# Patient Record
Sex: FEMALE | Race: WHITE | NOT HISPANIC OR LATINO | Employment: FULL TIME | URBAN - METROPOLITAN AREA
[De-identification: names, ages, dates, MRNs, and addresses within clinical notes are randomized per-mention and may not be internally consistent; named-entity substitution may affect disease eponyms.]

---
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
## 2018-01-02 DIAGNOSIS — F41.9 ANXIETY: ICD-10-CM

## 2018-01-02 RX ORDER — ALPRAZOLAM 0.25 MG/1
0.25 TABLET ORAL 2 TIMES DAILY PRN
Qty: 10 TAB | Refills: 0 | Status: SHIPPED
Start: 2018-01-02 | End: 2018-01-15 | Stop reason: SDUPTHER

## 2018-01-02 NOTE — TELEPHONE ENCOUNTER
Was the patient seen in the last year in this department? Yes  refill 11/22. Can you check the amount and sig    Does patient have an active prescription for medications requested? No     Received Request Via: Patient

## 2018-01-11 DIAGNOSIS — F41.9 ANXIETY: ICD-10-CM

## 2018-01-15 ENCOUNTER — OFFICE VISIT (OUTPATIENT)
Dept: MEDICAL GROUP | Facility: LAB | Age: 18
End: 2018-01-15
Payer: OTHER GOVERNMENT

## 2018-01-15 VITALS
BODY MASS INDEX: 19.33 KG/M2 | TEMPERATURE: 98 F | OXYGEN SATURATION: 100 % | HEART RATE: 98 BPM | WEIGHT: 116 LBS | HEIGHT: 65 IN | RESPIRATION RATE: 12 BRPM

## 2018-01-15 DIAGNOSIS — G47.9 SLEEP DISTURBANCE: ICD-10-CM

## 2018-01-15 DIAGNOSIS — F41.1 GAD (GENERALIZED ANXIETY DISORDER): ICD-10-CM

## 2018-01-15 DIAGNOSIS — F41.0 ANXIETY ATTACK: ICD-10-CM

## 2018-01-15 PROCEDURE — 99213 OFFICE O/P EST LOW 20 MIN: CPT | Performed by: NURSE PRACTITIONER

## 2018-01-15 RX ORDER — TRAZODONE HYDROCHLORIDE 50 MG/1
50 TABLET ORAL EVERY EVENING
Qty: 30 TAB | Refills: 3 | Status: SHIPPED | OUTPATIENT
Start: 2018-01-15 | End: 2018-02-09

## 2018-01-15 RX ORDER — ALPRAZOLAM 0.25 MG/1
0.25 TABLET ORAL 2 TIMES DAILY PRN
Qty: 20 TAB | Refills: 0 | Status: SHIPPED
Start: 2018-01-15 | End: 2018-02-14

## 2018-01-15 ASSESSMENT — PATIENT HEALTH QUESTIONNAIRE - PHQ9: CLINICAL INTERPRETATION OF PHQ2 SCORE: 0

## 2018-01-15 NOTE — PROGRESS NOTES
"CC  Follow up on anxiety    HPI  17-year-old established female here to follow-up on anxiety. Anxiety and depression are chronic issues for her. Feels that her depression is improved. Anxiety is calming down. At our last visit we changed to lorazepam for panic attacks but she is no undertaking this, she states that Lorazepam worsened her anxiety.  Xanax does work well for her - helps relieve her anxiety attacks during the day and fall asleep at night.  Likes wellbutrin - less random crying and no depressive symptoms.   Motivation is good as well and energy is increased.  She does have an appointment with psychiatry at the beginning of February.  In terms of her sleep, she has trouble falling and staying asleep.  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   Nonsmoker.    2/9/2018.      Past medical, surgical, family, and social history is reviewed and updated in Epic chart by me today.   Medications and allergies reviewed and updated in Epic chart by me today.     ROS:   As documented in history of present illness above    Exam:  Pulse 98, temperature 36.7 °C (98 °F), resp. rate 12, height 1.651 m (5' 5\"), weight 52.6 kg (116 lb), SpO2 100 %.  Gen. appears healthy in no distress   Skin appropriate for sex and age   HEENT unremarkable   Lungs clear   Heart regular   Neuro gait and station normal   Psych appropriate, calm, interactive, well groomed, makes good eye contact      A/P:  1. Sleep disturbance  -Suspect that Wellbutrin may be contributing to her sleep disturbance and will do a trial of trazodone 50 mg prior to bedtime for the next 1-2 months, then potentially trying to stop this medication and she becomes more accustomed to taking Wellbutrin. Discussed the importance of refraining from caffeine after morning hours and encouraged her to exercise every day. Discussed potential side effects of trazodone and encouraged her to allow at least 8 hours for sleep when taking trazodone.  - trazodone " (DESYREL) 50 MG Tab; Take 1 Tab by mouth every evening.  Dispense: 30 Tab; Refill: 3    2. ASHLEY (generalized anxiety disorder)  -Continue Wellbutrin. Continue alprazolam only sparingly and as needed for acute or severe panic attacks.  - trazodone (DESYREL) 50 MG Tab; Take 1 Tab by mouth every evening.  Dispense: 30 Tab; Refill: 3    3. Anxiety attack  -Encouraged her to keep her appointment with psychiatry in early February as I am concerned that Wellbutrin may not be the perfect drug for this patient as it does seem to interrupt her sleep area she will follow-up with me one week after meeting with psychiatry. We discussed dependent qualities of alprazolam and she understands to take it sparingly, not on a day-to-day basis. Discussed the potential for chemical dependency and overdose of alprazolam. She understands to avoid alcohol when taking alprazolam.  - alprazolam (XANAX) 0.25 MG Tab; Take 1 Tab by mouth 2 times a day as needed for Anxiety (or panic attack) for up to 30 days.  Dispense: 20 Tab; Refill: 0

## 2018-02-09 ENCOUNTER — OFFICE VISIT (OUTPATIENT)
Dept: BEHAVIORAL HEALTH | Facility: PHYSICIAN GROUP | Age: 18
End: 2018-02-09
Payer: OTHER GOVERNMENT

## 2018-02-09 VITALS
SYSTOLIC BLOOD PRESSURE: 134 MMHG | HEART RATE: 94 BPM | WEIGHT: 111 LBS | DIASTOLIC BLOOD PRESSURE: 87 MMHG | HEIGHT: 64 IN | BODY MASS INDEX: 18.95 KG/M2

## 2018-02-09 DIAGNOSIS — F33.42 MDD (MAJOR DEPRESSIVE DISORDER), RECURRENT, IN FULL REMISSION (HCC): ICD-10-CM

## 2018-02-09 PROCEDURE — 99204 OFFICE O/P NEW MOD 45 MIN: CPT | Performed by: PSYCHIATRY & NEUROLOGY

## 2018-02-09 ASSESSMENT — PATIENT HEALTH QUESTIONNAIRE - PHQ9: CLINICAL INTERPRETATION OF PHQ2 SCORE: 0

## 2018-02-09 NOTE — PROGRESS NOTES
"INITIAL PSYCHIATRY EVALUATION      Chief Complaint   Patient presents with   • Depression   • Anxiety         History Of Present Illness:  Tania Melo is a 17 y.o. old female with history of depressive disorder, anxiety disorder referred by MARQUISE Portillo for evaluation of anxiety and depression. She reports onset of this episode of depression about 2-3 months ago. She describes her depression as feeling sad, decreased motivation, impaired sleep, feeling isolated, agitation and irritability, anxiety lasting for weeks to months in a row. She states that this time it got to a point where she was unable to go to school because of her depression. She was started on Wellbutrin about 2 months ago and she is feeling \"200% better\". She has noted some apathy with Wellbutrin but denies any other side effects. She has had on and off symptoms of depression since she was about 6 years old. She states that her depression started when her dad was deployed overseas for about 2 years. She denies any precipitating factors for her depression at this time. She does have relationship stressors with her mother but she gets well along with her father and younger brother. She denies any current ups and downs in her appetite. She does struggle with sleep on and off. She has been sleeping a little bit better but does struggle with getting a good 6-8 hour sleep. She does not have a fixed sleeping schedule and sometimes sleeps poorly due to school are hanging out with friends. She is doing good in school and denies any school related stressors. She denies any current self-harm behaviors or reckless or impulsive actions. She does endorse anxiety which gets worse whenever she is depressed. She is unable to relax and has anxiety attacks. She does take Xanax a few times a week which helps her with anxiety. She was also taking trazodone for sleep which is not working and she is no longer taking it. She denies any current " symptoms of hypomania/kendrick or psychosis. Denies having active or passive thoughts, intent or plan of wanting to hurt herself or others.    Current psychiatric medications - Wellbutrin  mg daily, Xanax 0.25 mg daily as needed for anxiety    Past Psychiatric History:  She reports suffering from depressive and anxiety episodes since she was 6 years old and was in counseling at the age of 10 and 16. She was tried on Zoloft about 2 years ago which she was unable to continue due to side effects.  Prior psychiatric hospitalization - Denies   Self harm/suicide attempt - Reports cutting behaviors in seventh grade and denies any self-harm behavior since then. She reports having suicidal ideations in seventh grade for which she was taken to the ER at [place] but was not hospitalized and was sent home after a few hours.   Previous medication trials - Zoloft x 2 weeks     Past Medical/Surgical History:  History reviewed. No pertinent past medical history.  History reviewed. No pertinent surgical history.    Family Psychiatric History:  Mother - possible borderline personality disorder  Maternal side of her family - bipolar and narcissistic personality disorder    Substance Use/Addiction History:  Alcohol - Endorses drinking few sips infrequently, denies daily drinking or binge drinking or black outs.  Nicotine - Denies  Illicit drugs - Denies    Social History:    Allergies:  Egg yolk and Other misc    Medications:  Current Outpatient Prescriptions   Medication Sig Dispense Refill   • buPROPion (WELLBUTRIN XL) 150 MG XL tablet Take 1 Tab by mouth every morning. 30 Tab 5   • levonorgestrel-ethinyl estradiol (JOLESSA) 0.15-0.03 MG per tablet TAKE 1 TABLET DAILY 91 Tab 3   • alprazolam (XANAX) 0.25 MG Tab Take 1 Tab by mouth 2 times a day as needed for Anxiety (or panic attack) for up to 30 days. 20 Tab 0   • NON SPECIFIED Vaginal diazepam 5 mg   #10, zero refills  Insert vaginally 30 minutes prior to intercourse 10 Each  "0     No current facility-administered medications for this visit.        Review of Symptoms:  Constitutional - Negative for fatigue  Eyes - Negative for blurry vision  HEENT - Negative for sore throat  Respiratory - Negative for shortness of breath, cough  CVS - Negative for chest pain, palpitations  GI - Negative for nausea, vomiting, abdominal pain, diarrhea, constipation  Skin - Negative for rash  Musculoskeletal - Negative for back pain  Neurological - Positive for infrequent headaches  Psychiatric - Positive for occasional anxiety, poor sleep    Physical Examination:  Vital signs: /87   Pulse 94   Ht 1.626 m (5' 4\")   Wt 50.3 kg (111 lb)   LMP 02/05/2018   BMI 19.05 kg/m²     Musculoskeletal: Normal gait. No abnormal movements.     Mental Status Evaluation:   General: Young  female, dressed in casual attire, good grooming and hygiene, in no apparent distress, calm and cooperative, good eye contact, no psychomotor agitation or retardation  Orientation: Alert and oriented to person, place and time  Recent and remote memory: Intact  Attention span and concentration: Intact  Speech: Spontaneous, normal rate, rhythm and tone  Thought Process: Linear, logical and goal directed  Thought Content: Denies suicidal or homicidal ideations, intent or plan  Perception: Denies auditory or visual hallucinations. No delusions noted  Associations: Intact  Language: Appropriate  Fund of knowledge and vocabulary: Adequate  Mood: \"am okay\"  Affect: Euthymic, mood congruent  Insight: Good  Judgment: Good    Depression screening:  Depression Screen (PHQ-2/PHQ-9) 12/18/2017 1/15/2018 2/9/2018   PHQ-2 Total Score - - -   PHQ-2 Total Score 0 0 0   PHQ-9 Total Score - - -   PHQ-9 Total Score - - -       Interpretation of PHQ-9 Total Score   Score Severity   1-4 No Depression   5-9 Mild Depression   10-14 Moderate Depression   15-19 Moderately Severe Depression   20-27 Severe Depression    Medical " Records/Labs/Diagnostic Tests Reviewed:  [place]  records - appropriate refills, no abuse suspected    Impression:  Young female with recurrent symptoms of depression with anxiety is currently doing better with Wellbutrin.    1. Major depressive disorder, recurrent, in full remission with anxious distress    Plan:  1. Continue Wellbutrin  mg daily for anxiety and depression  2. Continue Xanax 0.25 mg daily as needed for anxiety. Not refilled today. Discussed long-term side effects including tolerance and withdrawal potential and encouraged her to minimize her Xanax use as much as she can. She is a young adult with minimal coping skills and I have encouraged her to work on her coping skills with a therapist and to use her skills rather than relying on Xanax whenever she is feeling anxious.  3. Referral to individual psychotherapy  4. Her sleep issues appear to be more related to impaired sleep hygiene rather than a side effect from Wellbutrin. She does not have a fixed sleeping schedule and consumes significant amounts of caffeine. I discussed sleep hygiene with her and encouraged her to have a fixed sleeping schedule, to minimize distractions before bedtime and to minimize stimulant intake throughout the day. I have discontinued her Trazodone she is not endorsing much benefit from it.  5. I have advised her to continue with her PCP for now and if she notices any decline in her mood or anxiety symptoms she can schedule a follow-up appointment with me.    Return to clinic on as needed basis if symptoms worsen    The proposed treatment plan was discussed with the patient who was provided the opportunity to ask questions and make suggestions regarding alternative treatment. Patient verbalized understanding and expressed agreement with the plan.     Thank you for allowing me to participate in the care of this patient.    Anna aMrin M.D.  02/09/18    CC:   MARQUISE Portillo    This note was created  using voice recognition software (Dragon). The accuracy of the dictation is limited by the abilities of the software. I have reviewed the note prior to signing, however some errors in grammar and context are still possible. If you have any questions related to this note please do not hesitate to contact our office.

## 2018-02-20 ENCOUNTER — OFFICE VISIT (OUTPATIENT)
Dept: MEDICAL GROUP | Facility: LAB | Age: 18
End: 2018-02-20
Payer: OTHER GOVERNMENT

## 2018-02-20 ENCOUNTER — HOSPITAL ENCOUNTER (OUTPATIENT)
Facility: MEDICAL CENTER | Age: 18
End: 2018-02-20
Attending: NURSE PRACTITIONER
Payer: OTHER GOVERNMENT

## 2018-02-20 VITALS
DIASTOLIC BLOOD PRESSURE: 78 MMHG | SYSTOLIC BLOOD PRESSURE: 128 MMHG | OXYGEN SATURATION: 99 % | HEIGHT: 64 IN | BODY MASS INDEX: 19.46 KG/M2 | RESPIRATION RATE: 12 BRPM | HEART RATE: 102 BPM | WEIGHT: 114 LBS | TEMPERATURE: 99.3 F

## 2018-02-20 DIAGNOSIS — Z11.3 SCREEN FOR STD (SEXUALLY TRANSMITTED DISEASE): ICD-10-CM

## 2018-02-20 DIAGNOSIS — F33.42 MDD (MAJOR DEPRESSIVE DISORDER), RECURRENT, IN FULL REMISSION (HCC): ICD-10-CM

## 2018-02-20 DIAGNOSIS — F41.1 GAD (GENERALIZED ANXIETY DISORDER): ICD-10-CM

## 2018-02-20 DIAGNOSIS — R41.840 ATTENTION DEFICIT: ICD-10-CM

## 2018-02-20 PROCEDURE — 87591 N.GONORRHOEAE DNA AMP PROB: CPT

## 2018-02-20 PROCEDURE — 87491 CHLMYD TRACH DNA AMP PROBE: CPT

## 2018-02-20 PROCEDURE — 99214 OFFICE O/P EST MOD 30 MIN: CPT | Performed by: NURSE PRACTITIONER

## 2018-02-20 RX ORDER — DEXTROAMPHETAMINE SACCHARATE, AMPHETAMINE ASPARTATE MONOHYDRATE, DEXTROAMPHETAMINE SULFATE AND AMPHETAMINE SULFATE 1.25; 1.25; 1.25; 1.25 MG/1; MG/1; MG/1; MG/1
5 CAPSULE, EXTENDED RELEASE ORAL EVERY MORNING
Qty: 30 CAP | Refills: 0 | Status: SHIPPED | OUTPATIENT
Start: 2018-02-20 | End: 2018-03-22 | Stop reason: SDUPTHER

## 2018-02-20 ASSESSMENT — PATIENT HEALTH QUESTIONNAIRE - PHQ9: CLINICAL INTERPRETATION OF PHQ2 SCORE: 0

## 2018-02-21 DIAGNOSIS — Z11.3 SCREEN FOR STD (SEXUALLY TRANSMITTED DISEASE): ICD-10-CM

## 2018-02-21 NOTE — PROGRESS NOTES
"Chief Complaint   Patient presents with   • Depression     F/V on depression    • Orders Needed     pt would orders for STD testing after having a high risk partner, no current symptoms        HPI   17-year-old established female here to follow-up on a couple of things:  #1- anxiety and depression: Improved. Doing well on Wellbutrin 150 mg XL daily. She did seek psychiatry and was told that her medication was appropriate and to continue on the same thing. Denies any suicidal or homicidal ideations. Has a bit of apathy. Appetite is good. Sleeping decently at night, occasional trouble falling asleep and waking up periodically.     #2-she is concerned about having brain fog and difficulty concentrating. Describes feeling bimal in class, \"mash potato\" brain, having to re-read material 4-5 x, loosing items easily such as phone (a few x per day), loosing papers in class.   Grades doing well in a few classes. C&D in others.  Wants to interrupt people frequently.  Frequently leaving projects incomplete.    Has experienced these symptoms since about 8th grade but never treated.     #3-she is requesting STD screening. She denies any current symptoms such as abnormal vaginal discharge, pelvic pain, vaginal itching, night sweats, fevers, chills, unintentional weight loss. She was sexually active with a high risk partner for 2 weeks. Her high risk partner notified her that she had been sexually active with over 100 people in the past year. Her high risk partner was transgender as a male, had a vagina. There was a lot of fluid exchange per patient but no penetration.       Past medical, surgical, family, and social history is reviewed and updated in Epic chart by me today.   Medications and allergies reviewed and updated in Epic chart by me today.     ROS:   As documented in history of present illness above    Exam:  Blood pressure 128/78, pulse (!) 102, temperature 37.4 °C (99.3 °F), resp. rate 12, height 1.626 m (5' 4\"), weight " 51.7 kg (114 lb), last menstrual period 02/05/2018, SpO2 99 %.  Gen. appears healthy in no distress   Skin appropriate for sex and age   HEENT unremarkable   Neck no adenopathy, no nodules or masses palpable   Lungs clear   Heart regular   Extremities no edema   Neuro gait and station normal   Psych appropriate, calm, interactive.      A/P:  1. Screen for STD (sexually transmitted disease)  -Recommend below STD screening. Encouraged her to repeat an HIV screening every 3 months for the next 6 months.  - HIV AG/AB COMBO ASSAY SCREENING; Future  - HEP C VIRUS ANTIBODY  - T.PALLIDUM AB EIA; Future  - CHLAMYDIA/GC PCR URINE OR SWAB; Future    2. MDD (major depressive disorder), recurrent, in full remission (CMS-HCC)  -Stable with Wellbutrin.    3. ASHLEY (generalized anxiety disorder)  -Stable with Wellbutrin.    4. Attention deficit  -Suspect that she may have attention deficit. We'll do a trial of Adderall XR 5 mg every morning. Started extremely low due to her history of anxiety. Discussed that if this medication makes her more anxious, she should discontinue it immediately and follow up with psychiatry. Discussed that Adderall is a controlled substance and there will be no early refills. She will need to be seen here in one month.  - amphetamine-dextroamphetamine (ADDERALL XR) 5 MG XR capsule; Take 1 Cap by mouth every morning for 30 days.  Dispense: 30 Cap; Refill: 0

## 2018-03-22 ENCOUNTER — OFFICE VISIT (OUTPATIENT)
Dept: MEDICAL GROUP | Facility: LAB | Age: 18
End: 2018-03-22
Payer: OTHER GOVERNMENT

## 2018-03-22 ENCOUNTER — HOSPITAL ENCOUNTER (OUTPATIENT)
Dept: LAB | Facility: MEDICAL CENTER | Age: 18
End: 2018-03-22
Attending: NURSE PRACTITIONER
Payer: OTHER GOVERNMENT

## 2018-03-22 VITALS
BODY MASS INDEX: 19.63 KG/M2 | HEIGHT: 64 IN | SYSTOLIC BLOOD PRESSURE: 90 MMHG | WEIGHT: 115 LBS | RESPIRATION RATE: 12 BRPM | OXYGEN SATURATION: 92 % | TEMPERATURE: 99.3 F | DIASTOLIC BLOOD PRESSURE: 62 MMHG | HEART RATE: 87 BPM

## 2018-03-22 DIAGNOSIS — R41.840 ATTENTION DEFICIT: ICD-10-CM

## 2018-03-22 DIAGNOSIS — F33.42 MDD (MAJOR DEPRESSIVE DISORDER), RECURRENT, IN FULL REMISSION (HCC): ICD-10-CM

## 2018-03-22 DIAGNOSIS — F41.9 ANXIETY: ICD-10-CM

## 2018-03-22 DIAGNOSIS — Z11.3 SCREEN FOR STD (SEXUALLY TRANSMITTED DISEASE): ICD-10-CM

## 2018-03-22 DIAGNOSIS — Z79.899 CONTROLLED SUBSTANCE AGREEMENT SIGNED: ICD-10-CM

## 2018-03-22 LAB
HCV AB SER QL: NEGATIVE
HIV 1+2 AB+HIV1 P24 AG SERPL QL IA: NON REACTIVE
TREPONEMA PALLIDUM IGG+IGM AB [PRESENCE] IN SERUM OR PLASMA BY IMMUNOASSAY: NON REACTIVE

## 2018-03-22 PROCEDURE — 86803 HEPATITIS C AB TEST: CPT

## 2018-03-22 PROCEDURE — 87389 HIV-1 AG W/HIV-1&-2 AB AG IA: CPT

## 2018-03-22 PROCEDURE — 36415 COLL VENOUS BLD VENIPUNCTURE: CPT

## 2018-03-22 PROCEDURE — 99214 OFFICE O/P EST MOD 30 MIN: CPT | Performed by: NURSE PRACTITIONER

## 2018-03-22 PROCEDURE — 86780 TREPONEMA PALLIDUM: CPT

## 2018-03-22 RX ORDER — DEXTROAMPHETAMINE SACCHARATE, AMPHETAMINE ASPARTATE MONOHYDRATE, DEXTROAMPHETAMINE SULFATE AND AMPHETAMINE SULFATE 1.25; 1.25; 1.25; 1.25 MG/1; MG/1; MG/1; MG/1
5 CAPSULE, EXTENDED RELEASE ORAL EVERY MORNING
Qty: 28 CAP | Refills: 0 | Status: SHIPPED | OUTPATIENT
Start: 2018-05-18 | End: 2018-06-07

## 2018-03-22 RX ORDER — PROPRANOLOL HYDROCHLORIDE 20 MG/1
10-20 TABLET ORAL DAILY
Qty: 45 TAB | Refills: 5 | Status: SHIPPED | OUTPATIENT
Start: 2018-03-22 | End: 2018-06-07

## 2018-03-22 RX ORDER — DEXTROAMPHETAMINE SACCHARATE, AMPHETAMINE ASPARTATE MONOHYDRATE, DEXTROAMPHETAMINE SULFATE AND AMPHETAMINE SULFATE 1.25; 1.25; 1.25; 1.25 MG/1; MG/1; MG/1; MG/1
5 CAPSULE, EXTENDED RELEASE ORAL EVERY MORNING
Qty: 28 CAP | Refills: 0 | Status: SHIPPED | OUTPATIENT
Start: 2018-04-20 | End: 2018-05-18

## 2018-03-22 RX ORDER — DEXTROAMPHETAMINE SACCHARATE, AMPHETAMINE ASPARTATE MONOHYDRATE, DEXTROAMPHETAMINE SULFATE AND AMPHETAMINE SULFATE 1.25; 1.25; 1.25; 1.25 MG/1; MG/1; MG/1; MG/1
5 CAPSULE, EXTENDED RELEASE ORAL EVERY MORNING
Qty: 28 CAP | Refills: 0 | Status: SHIPPED | OUTPATIENT
Start: 2018-03-22 | End: 2018-04-19

## 2018-03-22 ASSESSMENT — PATIENT HEALTH QUESTIONNAIRE - PHQ9: CLINICAL INTERPRETATION OF PHQ2 SCORE: 0

## 2018-03-22 NOTE — PROGRESS NOTES
"Chief Complaint   Patient presents with   • ADHD     F/V on meds,        HPI  17-year-old established female here to follow up on initiation of low-dose Adderall for what she calls brain fog. She has found that she is doing really well with Adderall and denies any negative side effects. Her concentration is much improved with reading and productivity at work. She finds herself interrupting others much less often. She feels very calm. She is sleeping well. Her mood 7 proved even more. Her appetite is fine. She denies any problems with Adderall such as increased anxiety, insomnia or low appetite.    She continues on Wellbutrin for anxiety and depression and feels this works very well for her without any side effects. She did have a psychiatry consult a few months ago and the psychiatrist felt positive about what the patient was prescribed.    Often she does complain that she has very sweaty hands and her heart rate is high prior to class. She feels persistently very anxious about school. She would like to know if there is a medicine she can take only on school days that could help lower her heart rate and help her relax.      Past medical, surgical, family, and social history is reviewed and updated in Epic chart by me today.   Medications and allergies reviewed and updated in Epic chart by me today.     ROS:   No SI or HI    Exam:  Blood pressure (!) 90/62, pulse 87, temperature 37.4 °C (99.3 °F), resp. rate 12, height 1.626 m (5' 4\"), weight 52.2 kg (115 lb), SpO2 92 %.  Constitutional: Alert, no distress, plus 3 vital signs  Skin:  Warm, dry, no rashes invisible areas  Eye: Equal, round and reactive, conjunctiva clear  ENMT: Lips without lesions, good dentition, oropharynx clear    Respiratory: Unlabored respiration, lungs clear to auscultation, no wheezes, no rhonchi  Cardiovascular: Normal rate and rhythm  Psych: Alert, pleasant, well-groomed, normal affect    A/P:  1. Anxiety  -Persistent with schooling. Trial " of propanolol tended 20 mg prior to school days. Discussed side effects of propanolol such as bradycardia, hypotension and fatigue.    2. MDD (major depressive disorder), recurrent, in full remission (CMS-[facility])  -Doing well with Wellbutrin. Continue same. Follow up 3 months.    3. Attention deficit  -Reviewed her  profile, last refill was February 22 by myself. Doing well with Adderall. Reviewed side effects. Controlled substance contract signed. [facility] collected. Follow up 3 months.  - amphetamine-dextroamphetamine (ADDERALL XR) 5 MG XR capsule; Take 1 Cap by mouth every morning for 28 days.  Dispense: 28 Cap; Refill: 0  - amphetamine-dextroamphetamine (ADDERALL XR) 5 MG XR capsule; Take 1 Cap by mouth every morning for 28 days.  Dispense: 28 Cap; Refill: 0  - amphetamine-dextroamphetamine (ADDERALL XR) 5 MG XR capsule; Take 1 Cap by mouth every morning for 28 days.  Dispense: 28 Cap; Refill: 0    4. Controlled substance agreement signed  - Controlled Substance Treatment Agreement  - [facility] PAIN MANAGEMENT SCREEN; Future

## 2018-03-28 ENCOUNTER — OFFICE VISIT (OUTPATIENT)
Dept: BEHAVIORAL HEALTH | Facility: PHYSICIAN GROUP | Age: 18
End: 2018-03-28
Payer: OTHER GOVERNMENT

## 2018-03-28 DIAGNOSIS — F41.9 ANXIETY: ICD-10-CM

## 2018-03-28 DIAGNOSIS — F32.A DEPRESSION, UNSPECIFIED DEPRESSION TYPE: ICD-10-CM

## 2018-03-28 PROCEDURE — 90791 PSYCH DIAGNOSTIC EVALUATION: CPT | Performed by: SOCIAL WORKER
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
## 2018-06-07 ENCOUNTER — HOSPITAL ENCOUNTER (EMERGENCY)
Facility: MEDICAL CENTER | Age: 18
End: 2018-06-08
Attending: EMERGENCY MEDICINE
Payer: OTHER GOVERNMENT

## 2018-06-07 DIAGNOSIS — F32.2 SEVERE DEPRESSION (HCC): ICD-10-CM

## 2018-06-07 DIAGNOSIS — R45.851 SUICIDAL IDEATION: ICD-10-CM

## 2018-06-07 PROCEDURE — 99285 EMERGENCY DEPT VISIT HI MDM: CPT

## 2018-06-07 PROCEDURE — 302970 POC BREATHALIZER: Performed by: EMERGENCY MEDICINE

## 2018-06-07 RX ORDER — ALPRAZOLAM 0.25 MG/1
0.25 TABLET ORAL NIGHTLY PRN
Status: SHIPPED | COMMUNITY
End: 2018-06-08

## 2018-06-07 ASSESSMENT — PAIN SCALES - GENERAL
PAINLEVEL_OUTOF10: 0
PAINLEVEL_OUTOF10: 0

## 2018-06-08 ENCOUNTER — APPOINTMENT (OUTPATIENT)
Dept: SCHEDULING | Facility: IMAGING CENTER | Age: 18
End: 2018-06-08

## 2018-06-08 VITALS
RESPIRATION RATE: 16 BRPM | BODY MASS INDEX: 19.83 KG/M2 | HEART RATE: 98 BPM | TEMPERATURE: 97.6 F | HEIGHT: 65 IN | OXYGEN SATURATION: 99 % | WEIGHT: 119.05 LBS | SYSTOLIC BLOOD PRESSURE: 134 MMHG | DIASTOLIC BLOOD PRESSURE: 81 MMHG

## 2018-06-08 LAB
AMPHETAMINES UR QL: NEGATIVE
BARBITURATES UR QL SCN: NEGATIVE
BENZODIAZ UR QL SCN: POSITIVE
BZE UR QL SCN: NEGATIVE
PCP UR QL SCN: NEGATIVE
POC BREATHALIZER: 0 PERCENT (ref 0–0.01)
UR OPIATES 2659: NEGATIVE
UR THC 2511T: NEGATIVE
UR TRICYCLIC 2660: NEGATIVE

## 2018-06-08 PROCEDURE — A9270 NON-COVERED ITEM OR SERVICE: HCPCS | Performed by: EMERGENCY MEDICINE

## 2018-06-08 PROCEDURE — 700102 HCHG RX REV CODE 250 W/ 637 OVERRIDE(OP): Performed by: EMERGENCY MEDICINE

## 2018-06-08 PROCEDURE — 80305 DRUG TEST PRSMV DIR OPT OBS: CPT

## 2018-06-08 RX ORDER — LEVONORGESTREL AND ETHINYL ESTRADIOL 0.15-0.03
1 KIT ORAL DAILY
Status: DISCONTINUED | OUTPATIENT
Start: 2018-06-09 | End: 2018-06-08 | Stop reason: HOSPADM

## 2018-06-08 RX ORDER — BUPROPION HYDROCHLORIDE 150 MG/1
150 TABLET, EXTENDED RELEASE ORAL DAILY
Status: DISCONTINUED | OUTPATIENT
Start: 2018-06-08 | End: 2018-06-08 | Stop reason: HOSPADM

## 2018-06-08 RX ORDER — CLONAZEPAM 0.5 MG/1
0.5 TABLET ORAL NIGHTLY PRN
Qty: 7 TAB | Refills: 0 | Status: SHIPPED | OUTPATIENT
Start: 2018-06-08 | End: 2018-06-27 | Stop reason: SDUPTHER

## 2018-06-08 RX ADMIN — BUPROPION HYDROCHLORIDE 150 MG: 150 TABLET, FILM COATED, EXTENDED RELEASE ORAL at 11:17

## 2018-06-08 ASSESSMENT — ENCOUNTER SYMPTOMS
DEPRESSION: 1
SHORTNESS OF BREATH: 0
FEVER: 0
CHILLS: 0

## 2018-06-08 NOTE — ED PROVIDER NOTES
"ED Provider Note    CHIEF COMPLAINT  Chief Complaint   Patient presents with   • Suicidal Ideation     Pt reports that tonight she feels like if she stayed home she would kill herself, BIB parents. Pt has a plan to drive out into the middle of nowhere and either take pills or cut her wrists. Pt did not attempt tonight. Pt has previous attempts and hospitalizations, dx with depression and anxiety but states \"I think I have borderline personality disorder.\" Denies HI. Denies drug or alcohol use tonight.        HPI  Tania Melo is a 17 y.o. female who presents With suicidal ideation. Patient with history of severe depression and history of suicidal attempts and suicidal ideation in the past requiring admission. Patient reports she has a plan, she would either take pills or cutting her wrist. She denies any ingestions today or attempts today or recently. She denies any nausea vomiting. She reports she is not pregnant. She denies any auditory or visual hallucinations. She denies any homicidal ideation.    REVIEW OF SYSTEMS  Review of Systems   Constitutional: Negative for chills and fever.   Respiratory: Negative for shortness of breath.    Cardiovascular: Negative for chest pain.   Psychiatric/Behavioral: Positive for depression and suicidal ideas.   All other systems reviewed and are negative.      See HPI for further details. All other systems are negative.     PAST MEDICAL HISTORY   has a past medical history of Psychiatric disorder.    SOCIAL HISTORY  Social History     Social History Main Topics   • Smoking status: Never Smoker   • Smokeless tobacco: Never Used   • Alcohol use Yes      Comment: occasional    • Drug use: Yes     Types: Inhaled      Comment: twice a year marijuana    • Sexual activity: No      Comment: menses regular - started 6th grade       SURGICAL HISTORY  patient denies any surgical history    CURRENT MEDICATIONS  Home Medications     Reviewed by Beverley Ding R.N. (Registered " Nurse) on 06/07/18 at 2152  Med List Status: Complete   Medication Last Dose Status   ALPRAZolam (XANAX) 0.25 MG Tab  Active   buPROPion (WELLBUTRIN XL) 150 MG XL tablet 6/7/2018 Active   levonorgestrel-ethinyl estradiol (JOLESSA) 0.15-0.03 MG per tablet 6/7/2018 Active                ALLERGIES  Allergies   Allergen Reactions   • Egg Yolk    • Other Misc      cats       PHYSICAL EXAM  Physical Exam   Constitutional: She is oriented to person, place, and time. She appears well-developed and well-nourished.   HENT:   Head: Normocephalic and atraumatic.   Eyes: Conjunctivae are normal. Pupils are equal, round, and reactive to light.   Neck: Normal range of motion. Neck supple.   Cardiovascular: Normal rate and regular rhythm.    Pulmonary/Chest: Effort normal and breath sounds normal.   Abdominal: Soft. Bowel sounds are normal. She exhibits no distension. There is no tenderness. There is no rebound.   Musculoskeletal: Normal range of motion.   Neurological: She is alert and oriented to person, place, and time.   Skin: Skin is warm and dry.   Psychiatric:   Flat affect, SI, no homicidal ideation, no auditory or visual hallucinations.         DIAGNOSTIC STUDIES / PROCEDURES    Results for orders placed or performed during the hospital encounter of 06/07/18   UR DRUG SCREEN([place] ONLY)   Result Value Ref Range    Phencyclidine -Pcp Negative Negative    Benzodiazepines Positive (A) Negative    Cocaine Metabolite Negative Negative    Amphetamines By Triage Negative Negative    Urine THC Negative Negative    Codeine-Morphine Negative Negative    Barbiturates Negative Negative    Tricyclic Antidepressants Negative Negative   POC BREATHALIZER   Result Value Ref Range    POC Breathalizer 0.001 0.00 - 0.01 Percent           COURSE & MEDICAL DECISION MAKING  Pertinent Labs & Imaging studies reviewed. (See chart for details)  Patient with suicidal ideation and plan. Patient along Cipro and removed from her and placed and  secure location. Patient will be admitted to psychiatric unit and held in the emergency department until this time. Patient has been placed on a psychiatric hold and is under one-to-one observation. Patient up-to-date on plan on process. The crisis team has evaluated the patient and agrees the patient would benefit from admission. Will transfer patient to psychiatric facility when available.      FINAL IMPRESSION  1. Suicidal ideation with plan         Electronically signed by: Eliecer Mccabe, 6/7/2018 11:05 PM

## 2018-06-08 NOTE — PSYCHIATRY
BRIEF PSYCHIATRIC CONSULT NOTE: patient seen, full note to follow.  -Legal Hold:luis'sarai  -Sitter:no  -Restrictions: n/a  --Plan:signing off      She has an appointment with Rachael Murdock to start DBT; in the meantime still has a CBT therapist she can follow with. PCP is very available to her and she has seen Dr Marin in the past.     She has no refills of any outpatient benzos; hasn't been on any since February; I will call pharmacy to verify this. I would like her to start on klonopin .5mg po qhs for anxiety and vaginismus for the time being, 1 week supply from ED but I think if it's helpful it would be worthwhile to continue it scheduled at night for several months.No issues from notes related to substances so I am not concerned about this.     I do not recommend prn medication for anxiety as it can be very reinforcing and can interfere with psychotherapy. Klonopin is much less reinforcing as it is long acting and does not have a quick onset of action, but also stressed to patient I don't want her taking it prn.     She stopped stimulant; instructed her not to restart her stimulant and will verify this with the pharmacy. She is likely experiencing concentration problems related to depression, anxiety, and emotional dysregulation and not ADHD.     Recommend her outpatient therapist r/o OCD. Agree with patient she is exhibiting borderline traits. Recommend DBT therapy which she already has set up.     Signing off, thank you for the consult.

## 2018-06-08 NOTE — ED NOTES
"Chief Complaint   Patient presents with   • Suicidal Ideation     Pt reports that tonight she feels like if she stayed home she would kill herself, BIB parents. Pt has a plan to drive out into the middle of nowhere and either take pills or cut her wrists. Pt did not attempt tonight. Pt has previous attempts and hospitalizations, dx with depression and anxiety but states \"I think I have borderline personality disorder.\" Denies HI. Denies drug or alcohol use tonight.      /87   Pulse (!) 104   Temp 37.6 °C (99.6 °F)   Resp 18   Ht 1.651 m (5' 5\")   Wt 54 kg (119 lb 0.8 oz)   SpO2 99%   BMI 19.81 kg/m²     "

## 2018-06-08 NOTE — ED NOTES
Direct observation in Triage, pt sitting with parents on her phone. Cooperative and calm at this time.

## 2018-06-08 NOTE — ED NOTES
0715 Report received from [facility] care at this time  Pt calm sleeping  Direct observation in place

## 2018-06-08 NOTE — ED NOTES
Consultation completed , pt taken off legal hold.  All belongings returned to pt.  Pt calm and co operative , denies any si or ho thoughts   D/c pt home, 1 rx given . Pt aware of f/u instructions , aware to return for any changes or concerns. No further questions upon d/c home from ed  Pt aware of crisis hotline

## 2018-06-08 NOTE — ED NOTES
Radha from [facility] called and said pt's packet is under review.

## 2018-06-08 NOTE — ED NOTES
Pt requesting her normally rx medication,   Pt currently in b-control. Medication taken to pharmacy for verification

## 2018-06-08 NOTE — ED PROVIDER NOTES
ED Provider Note    I was signed out the ongoing care and possible final disposition on this patient who is a 17-year-old female with possible borderline personality disorder who is threatening to overdose on pills for cut her wrist to commit suicide. She does have previous suicide attempts with the depression and anxiety history. Denies any drug or alcohol abuse and has been resting comfortably throughout the night since arrival    We will continue to monitor her throughout the day and plan is to have her participate in a tele-psychiatry visit with the [employer] system today if possible    At approximately 2 PM the patient completed her tele-psych visit and the patient remains calm. I spoke with Dr. Diana's and we both feel the patient is a good candidate for outpatient therapy. She suggests 0.5 mg of Klonopin daily at bedtime for one week and follow-up with therapist.    Patient is comfortable with this plan of care and discharged in stable condition    1. Suicidal ideation    2. Severe depression (HCC)

## 2018-06-08 NOTE — DISCHARGE INSTRUCTIONS
Depression, Adult  Depression refers to feeling sad, low, down in the dumps, blue, gloomy, or empty. In general, there are two kinds of depression:  1. Normal sadness or normal grief. This kind of depression is one that we all feel from time to time after upsetting life experiences, such as the loss of a job or the ending of a relationship. This kind of depression is considered normal, is short lived, and resolves within a few days to 2 weeks. Depression experienced after the loss of a loved one (bereavement) often lasts longer than 2 weeks but normally gets better with time.  2. Clinical depression. This kind of depression lasts longer than normal sadness or normal grief or interferes with your ability to function at home, at work, and in school. It also interferes with your personal relationships. It affects almost every aspect of your life. Clinical depression is an illness.  Symptoms of depression can also be caused by conditions other than those mentioned above, such as:  · Physical illness. Some physical illnesses, including underactive thyroid gland (hypothyroidism), severe anemia, specific types of cancer, diabetes, uncontrolled seizures, heart and lung problems, strokes, and chronic pain are commonly associated with symptoms of depression.  · Side effects of some prescription medicine. In some people, certain types of medicine can cause symptoms of depression.  · Substance abuse. Abuse of alcohol and illicit drugs can cause symptoms of depression.  SYMPTOMS  Symptoms of normal sadness and normal grief include the following:  · Feeling sad or crying for short periods of time.  · Not caring about anything (apathy).  · Difficulty sleeping or sleeping too much.  · No longer able to enjoy the things you used to enjoy.  · Desire to be by oneself all the time (social isolation).  · Lack of energy or motivation.  · Difficulty concentrating or remembering.  · Change in appetite or weight.  · Restlessness or  agitation.  Symptoms of clinical depression include the same symptoms of normal sadness or normal grief and also the following symptoms:  · Feeling sad or crying all the time.  · Feelings of guilt or worthlessness.  · Feelings of hopelessness or helplessness.  · Thoughts of suicide or the desire to harm yourself (suicidal ideation).  · Loss of touch with reality (psychotic symptoms). Seeing or hearing things that are not real (hallucinations) or having false beliefs about your life or the people around you (delusions and paranoia).  DIAGNOSIS   The diagnosis of clinical depression is usually based on how bad the symptoms are and how long they have lasted. Your health care provider will also ask you questions about your medical history and substance use to find out if physical illness, use of prescription medicine, or substance abuse is causing your depression. Your health care provider may also order blood tests.  TREATMENT   Often, normal sadness and normal grief do not require treatment. However, sometimes antidepressant medicine is given for bereavement to ease the depressive symptoms until they resolve.  The treatment for clinical depression depends on how bad the symptoms are but often includes antidepressant medicine, counseling with a mental health professional, or both. Your health care provider will help to determine what treatment is best for you.  Depression caused by physical illness usually goes away with appropriate medical treatment of the illness. If prescription medicine is causing depression, talk with your health care provider about stopping the medicine, decreasing the dose, or changing to another medicine.  Depression caused by the abuse of alcohol or illicit drugs goes away when you stop using these substances. Some adults need professional help in order to stop drinking or using drugs.  SEEK IMMEDIATE MEDICAL CARE IF:  · You have thoughts about hurting yourself or others.  · You lose touch  with reality (have psychotic symptoms).  · You are taking medicine for depression and have a serious side effect.  FOR MORE INFORMATION  · National [place] on Mental Illness: www.[place].org   · National [place] of [employer] Health: www.nimh.nih.gov      This information is not intended to replace advice given to you by your health care provider. Make sure you discuss any questions you have with your health care provider.     Document Released: 12/15/2001 Document Revised: 01/08/2016 Document Reviewed: 03/18/2013  [employer] Patient Education ©2016 [employer]    Mood Disorders  Mood disorders are conditions that affect the way a person feels emotionally. The main mood disorders include:  · Depression.  · Bipolar disorder.  · Dysthymia. Dysthymia is a mild, lasting (chronic) depression. Symptoms of dysthymia are similar to depression, but not as severe.  · Cyclothymia. Cyclothymia includes mood swings, but the highs and lows are not as severe as they are in bipolar disorder. Symptoms of cyclothymia are similar to those of bipolar disorder, but less extreme.  CAUSES   Mood disorders are probably caused by a combination of factors. People with mood disorders seem to have physical and chemical changes in their brains. Mood disorders run in families, so there may be genetic causes. Severe trauma or stressful life events may also increase the risk of mood disorders.   SYMPTOMS   Symptoms of mood disorders depend on the specific type of condition.  Depression symptoms include:  · Feeling sad, worthless, or hopeless.  · Negative thoughts.  · Inability to enjoy one's usual activities.  · Low energy.  · Sleeping too much or too little.  · Appetite changes.  · Crying.  · Concentration problems.  · Thoughts of harming oneself.  Bipolar disorder symptoms include:  · Periods of depression (see above symptoms).  · Mood swings, from sadness and depression, to abnormal elation and excitement.  · Periods of kendrick:  · Racing  thoughts.  · Fast speech.  · Poor judgment, and careless, dangerous choices.  · Decreased need for sleep.  · Risky behavior.  · Difficulty concentrating.  · Irritability.  · Increased energy.  · Increased sex drive.  DIAGNOSIS   There are no blood tests or X-rays that can confirm a mood disorder. However, your caregiver may choose to run some tests to make sure that there is not another physical cause for your symptoms. A mood disorder is usually diagnosed after an in-depth interview with a caregiver.  TREATMENT   Mood disorders can be treated with one or more of the following:  · Medicine. This may include antidepressants, mood-stabilizers, or anti-psychotics.  · Psychotherapy (talk therapy).  · Cognitive behavioral therapy. You are taught to recognize negative thoughts and behavior patterns, and replace them with healthy thoughts and behaviors.  · Electroconvulsive therapy. For very severe cases of deep depression, a series of treatments in which an electrical current is applied to the brain.  · Vagus nerve stimulation. A pulse of electricity is applied to a portion of the brain.  · Transcranial magnetic stimulation. Powerful magnets are placed on the head that produce electrical currents.  · Hospitalization. In severe situations, or when someone is having serious thoughts of harming him or herself, hospitalization may be necessary in order to keep the person safe. This is also done to quickly start and monitor treatment.  HOME CARE INSTRUCTIONS   · Take your medicine exactly as directed.  · Attend all of your therapy sessions.  · Try to eat regular, healthy meals.  · Exercise daily. Exercise may improve mood symptoms.  · Get good sleep.  · Do not drink alcohol or use pot or other drugs. These can worsen mood symptoms and cause anxiety and psychosis.  · Tell your caregiver if you develop any side effects, such as feeling sick to your stomach (nauseous), dry mouth, dizziness, constipation, drowsiness, tremor,  weight gain, or sexual symptoms. He or she may suggest things you can do to improve symptoms.  · Learn ways to cope with the stress of having a chronic illness. This includes yoga, meditation, mynor chi, or participating in a support group.  · Drink enough water to keep your urine clear or pale yellow. Eat a high-fiber diet. These habits may help you avoid constipation from your medicine.  SEEK IMMEDIATE MEDICAL CARE IF:  · Your mood worsens.  · You have thoughts of hurting yourself or others.  · You cannot care for yourself.  · You develop the sensation of hearing or seeing something that is not actually present (auditory or visual hallucinations).  · You develop abnormal thoughts.  Document Released: 10/15/2010 Document Revised: 03/11/2013 Document Reviewed: 10/15/2010  [employer]® Patient Information ©2014 [employer].
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
## 2018-06-08 NOTE — ED NOTES
ERP notified that pt would like to have books. ERP approved pt request for books to read. Books provided by tech.

## 2018-06-08 NOTE — ED NOTES
Pt requests that no medical information be released to her father.  States it is okay to release information to her mother Mylene Melo [phone].     Pt remains on close observation, sitter remains at bedside.

## 2018-06-08 NOTE — ED NOTES
1315 Patient presented for telemedicine consultation via secure and encrypted videoconferencing equipment.
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
## 2018-06-08 NOTE — ED NOTES
"Pt reports feeling suicidal off and on for extended period of time. Reports this has been worse since Friday. Pt reports she had intention to kill herself 6/1/18 Friday but did not. Pt reports feeling the same way tonight. Pt reports worsened SI is related to disagreement with parents, says that her parents are keeping the dog she trained that she understood to be hers, rather than theirs. Pt reports her mother \"has the same thing\" as the pt. Relates that her parents are \"not a good support system\" Pt denies having had a specific plan for tonight.   "

## 2018-06-08 NOTE — DISCHARGE PLANNING
SW received pt's legal hold.  BLANCA requested that [facility] send referrals to MH facilities.     Pt has a Tele-psych appointment today at 1300.     BLANCA notified DAVID Genao.

## 2018-06-13 ENCOUNTER — OFFICE VISIT (OUTPATIENT)
Dept: BEHAVIORAL HEALTH | Facility: PHYSICIAN GROUP | Age: 18
End: 2018-06-13
Payer: OTHER GOVERNMENT

## 2018-06-13 DIAGNOSIS — F32.A DEPRESSION, UNSPECIFIED DEPRESSION TYPE: ICD-10-CM

## 2018-06-13 DIAGNOSIS — F41.9 ANXIETY: ICD-10-CM

## 2018-06-13 PROCEDURE — 90834 PSYTX W PT 45 MINUTES: CPT | Performed by: SOCIAL WORKER

## 2018-06-13 NOTE — BH THERAPY
[facility]  Crisis/Safety Plan    Name:  Tania Melo  MRN:  [id number]  Date:  2018    Warning signs that a crisis may be developing for me or I may be at risk:  1) feelings of panic and feeling abandoned   2) increased irritability  3)     Coping strategies I can use on my own (relaxation, physical activity, etc):  1) name feeling, rate feeling, rate intensity   2)   3)     Ways I can make my environment safe:  1) avoid fights with mom  2)   3)    Things I want to tell myself when I feel a crisis developin) Don't destroy everything in your path  2)   3)    People I can contact for support or distraction (and their phone numbers):  1) Kb  2) Britton   3)    If I’m not able to reach my support people, or the above strategies don’t help, I can contact the following professionals, agencies, or hotlines:  1) Crisis Call Center ():  [phone] -OR- [phone]  2) Crisis Text Line ():  Text START to 066087  3)   4)     Josie Perdomo

## 2018-06-13 NOTE — BH THERAPY
" [facility]  Therapy Progress Note    Patient Name: Tania Melo  Patient MRN: [id number]  Today's Date: 6/13/2018     Type of session:Individual psychotherapy  Length of session: 60 minutes  Persons in attendance:Patient    Subjective/New Info: Client reported she had been hospitalized last week due to suicidal ideation, intent and plan. She spent the night in the hospital, and was released in the morning. She stated she has not been completely honest with clinician in last meeting, indicating she often feels vindictive, fears abandonment to the point of making unsafe choices, has impulsive spending issues, and making threats of suicide with the intent to harm others. She stated she feels embarrassed by this and that is why she didn't disclose. She denied current suicidal ideation, stating, \"I can tell you I won't do it now, or in a month from now. But I can't say I won't in 5 years.\"  She also reported she is not sleeping well at this time. Client and clinician agreed that she would benefit from DBT therapy, and also benefit from weekly therapy. She stated she has reached out to Rachael Murdock with [facility] and is waiting to make sure her insurance will cover it. Completed a safety plan with client. She will see Dr. Marin on 6/25/18.     Objective/Observations:   Participation: Active verbal participation, Engaged and Open to feedback   Grooming: Good and Casual   Cognition: Alert and Fully Oriented   Eye contact: Good   Mood: Depressed and Anxious   Affect: Constricted   Thought process: Logical   Speech: Rate within normal limits and Volume within normal limits   Other:     Diagnoses:   1. Depression, unspecified depression type    2. Anxiety    3.      r/o borderline personality disorder      Current risk:   SUICIDE: Moderate   Homicide: Low   Self-harm: Low   Relapse: Not applicable   Other:    Safety Plan reviewed? Yes   If evidence of imminent risk is present, intervention/plan: " Denied current intent, recently discharged from the hospital, completed a safety plan, instructed to call 911 or return to ER if ideation with intent returns.       Therapeutic Intervention(s): Stressors assessed and Supportive psychotherapy    Treatment Goal(s)/Objective(s) addressed: Referred out    Progress toward Treatment Goals: Moderate decline    Plan:  - Continue Medication management  - Termination of individual therapy  - Client has reached out to [facility] for weekly DBT. This will better meet client's needs at this time.     Josie Perdomo  6/13/2018

## 2018-06-25 ENCOUNTER — APPOINTMENT (OUTPATIENT)
Dept: BEHAVIORAL HEALTH | Facility: PHYSICIAN GROUP | Age: 18
End: 2018-06-25
Payer: OTHER GOVERNMENT

## 2018-06-27 ENCOUNTER — HOSPITAL ENCOUNTER (OUTPATIENT)
Dept: LAB | Facility: MEDICAL CENTER | Age: 18
End: 2018-06-27
Attending: NURSE PRACTITIONER
Payer: OTHER GOVERNMENT

## 2018-06-27 ENCOUNTER — OFFICE VISIT (OUTPATIENT)
Dept: MEDICAL GROUP | Facility: LAB | Age: 18
End: 2018-06-27
Payer: OTHER GOVERNMENT

## 2018-06-27 VITALS
DIASTOLIC BLOOD PRESSURE: 78 MMHG | RESPIRATION RATE: 12 BRPM | BODY MASS INDEX: 19.33 KG/M2 | WEIGHT: 116 LBS | OXYGEN SATURATION: 95 % | TEMPERATURE: 99.3 F | HEART RATE: 94 BPM | HEIGHT: 65 IN | SYSTOLIC BLOOD PRESSURE: 100 MMHG

## 2018-06-27 DIAGNOSIS — F33.0 MILD EPISODE OF RECURRENT MAJOR DEPRESSIVE DISORDER (HCC): ICD-10-CM

## 2018-06-27 DIAGNOSIS — G47.10 EXCESSIVE SLEEPINESS: ICD-10-CM

## 2018-06-27 DIAGNOSIS — F33.1 MODERATE EPISODE OF RECURRENT MAJOR DEPRESSIVE DISORDER (HCC): ICD-10-CM

## 2018-06-27 DIAGNOSIS — F41.9 ANXIETY: ICD-10-CM

## 2018-06-27 LAB
ALBUMIN SERPL BCP-MCNC: 4 G/DL (ref 3.2–4.9)
ALBUMIN/GLOB SERPL: 1.3 G/DL
ALP SERPL-CCNC: 50 U/L (ref 30–99)
ALT SERPL-CCNC: 10 U/L (ref 2–50)
ANION GAP SERPL CALC-SCNC: 7 MMOL/L (ref 0–11.9)
AST SERPL-CCNC: 16 U/L (ref 12–45)
BASOPHILS # BLD AUTO: 0.8 % (ref 0–1.8)
BASOPHILS # BLD: 0.04 K/UL (ref 0–0.12)
BILIRUB SERPL-MCNC: 0.5 MG/DL (ref 0.1–1.5)
BUN SERPL-MCNC: 8 MG/DL (ref 8–22)
CALCIUM SERPL-MCNC: 9.1 MG/DL (ref 8.5–10.5)
CHLORIDE SERPL-SCNC: 107 MMOL/L (ref 96–112)
CO2 SERPL-SCNC: 25 MMOL/L (ref 20–33)
CREAT SERPL-MCNC: 0.89 MG/DL (ref 0.5–1.4)
EOSINOPHIL # BLD AUTO: 0.07 K/UL (ref 0–0.51)
EOSINOPHIL NFR BLD: 1.4 % (ref 0–6.9)
ERYTHROCYTE [DISTWIDTH] IN BLOOD BY AUTOMATED COUNT: 39.7 FL (ref 35.9–50)
GLOBULIN SER CALC-MCNC: 3.2 G/DL (ref 1.9–3.5)
GLUCOSE SERPL-MCNC: 89 MG/DL (ref 65–99)
HCT VFR BLD AUTO: 41.9 % (ref 37–47)
HGB BLD-MCNC: 14.6 G/DL (ref 12–16)
IMM GRANULOCYTES # BLD AUTO: 0.01 K/UL (ref 0–0.11)
IMM GRANULOCYTES NFR BLD AUTO: 0.2 % (ref 0–0.9)
IRON SATN MFR SERPL: 46 % (ref 15–55)
IRON SERPL-MCNC: 171 UG/DL (ref 40–170)
LYMPHOCYTES # BLD AUTO: 2.31 K/UL (ref 1–4.8)
LYMPHOCYTES NFR BLD: 47.7 % (ref 22–41)
MCH RBC QN AUTO: 31.4 PG (ref 27–33)
MCHC RBC AUTO-ENTMCNC: 34.8 G/DL (ref 33.6–35)
MCV RBC AUTO: 90.1 FL (ref 81.4–97.8)
MONOCYTES # BLD AUTO: 0.28 K/UL (ref 0–0.85)
MONOCYTES NFR BLD AUTO: 5.8 % (ref 0–13.4)
NEUTROPHILS # BLD AUTO: 2.13 K/UL (ref 2–7.15)
NEUTROPHILS NFR BLD: 44.1 % (ref 44–72)
NRBC # BLD AUTO: 0 K/UL
NRBC BLD-RTO: 0 /100 WBC
PLATELET # BLD AUTO: 250 K/UL (ref 164–446)
PMV BLD AUTO: 10.2 FL (ref 9–12.9)
POTASSIUM SERPL-SCNC: 3.5 MMOL/L (ref 3.6–5.5)
PROT SERPL-MCNC: 7.2 G/DL (ref 6–8.2)
RBC # BLD AUTO: 4.65 M/UL (ref 4.2–5.4)
SODIUM SERPL-SCNC: 139 MMOL/L (ref 135–145)
T4 FREE SERPL-MCNC: 0.97 NG/DL (ref 0.53–1.43)
TIBC SERPL-MCNC: 374 UG/DL (ref 250–450)
TSH SERPL DL<=0.005 MIU/L-ACNC: 0.96 UIU/ML (ref 0.38–5.33)
WBC # BLD AUTO: 4.8 K/UL (ref 4.8–10.8)

## 2018-06-27 PROCEDURE — 84443 ASSAY THYROID STIM HORMONE: CPT

## 2018-06-27 PROCEDURE — 85025 COMPLETE CBC W/AUTO DIFF WBC: CPT

## 2018-06-27 PROCEDURE — 83550 IRON BINDING TEST: CPT

## 2018-06-27 PROCEDURE — 36415 COLL VENOUS BLD VENIPUNCTURE: CPT

## 2018-06-27 PROCEDURE — 83540 ASSAY OF IRON: CPT

## 2018-06-27 PROCEDURE — 84439 ASSAY OF FREE THYROXINE: CPT

## 2018-06-27 PROCEDURE — 80053 COMPREHEN METABOLIC PANEL: CPT

## 2018-06-27 PROCEDURE — 99214 OFFICE O/P EST MOD 30 MIN: CPT | Performed by: NURSE PRACTITIONER

## 2018-06-27 RX ORDER — BUPROPION HYDROCHLORIDE 150 MG/1
TABLET ORAL
Qty: 90 TAB | Refills: 0 | Status: SHIPPED | OUTPATIENT
Start: 2018-06-27 | End: 2018-11-02 | Stop reason: SDUPTHER

## 2018-06-27 RX ORDER — CLONAZEPAM 0.5 MG/1
0.5 TABLET ORAL NIGHTLY PRN
Qty: 7 TAB | Refills: 1 | Status: SHIPPED
Start: 2018-06-27 | End: 2018-07-04

## 2018-06-27 ASSESSMENT — PATIENT HEALTH QUESTIONNAIRE - PHQ9
6. FEELING BAD ABOUT YOURSELF - OR THAT YOU ARE A FAILURE OR HAVE LET YOURSELF OR YOUR FAMILY DOWN: 2
SUM OF ALL RESPONSES TO PHQ9 QUESTIONS 1 AND 2: 4
7. TROUBLE CONCENTRATING ON THINGS, SUCH AS READING THE NEWSPAPER OR WATCHING TELEVISION: 2
4. FEELING TIRED OR HAVING LITTLE ENERGY: 3
5. POOR APPETITE OR OVEREATING: 0
9. THOUGHTS THAT YOU WOULD BE BETTER OFF DEAD, OR OF HURTING YOURSELF: 1
2. FEELING DOWN, DEPRESSED, IRRITABLE, OR HOPELESS: 2
8. MOVING OR SPEAKING SO SLOWLY THAT OTHER PEOPLE COULD HAVE NOTICED. OR THE OPPOSITE, BEING SO FIGETY OR RESTLESS THAT YOU HAVE BEEN MOVING AROUND A LOT MORE THAN USUAL: 2
SUM OF ALL RESPONSES TO PHQ QUESTIONS 1-9: 14
3. TROUBLE FALLING OR STAYING ASLEEP OR SLEEPING TOO MUCH: 0
1. LITTLE INTEREST OR PLEASURE IN DOING THINGS: 2

## 2018-06-27 NOTE — PROGRESS NOTES
LISANDRA Michelle is a 18 yo est female here to f/u on recent ER visit in which she presented for SI.  She tells me that she was not considering taking too many pills or cutting her wrists, that she was considering drinking bleach because of feeling crowded by her mother in an acute disagreement.  She tells me that she and her mother have had a tumultuous relationship for quite a long time.  The patient tells me that she often feels that she needs some space and her mother was not allowing this.  Since her ER visit, she tells me her mother has been great and has been giving her her and space.  She is currently living at home.  She was not admitted to an inpatient psych facility from the ER.  A tele-visit with psychiatry suggested 0.5 mg Klonopin at bedtime which she has not filled the prescription .  Last visit with psychiatry was in February, Dr. Marin, who felt that the patient's depression was in remission and she was continued on Wellbutrin.  The patient does continue on Wellbutrin 150 mg daily.  Her Adderall was discontinued in the ER over concern of increasing her anxiety.    She tells me that she feels exhausted all the time which confuses her as she states that she is to be energetic and a morning person.  She is concerned about anemia or thyroid.  She has very light menstrual periods.  She does not have a family history of thyroid problems that she is aware of.  She also tells me that her moods are changing constantly, alternating between depressed, anxious and feeling well.  She tells me that she has a fear of abandonment which she is working on.  She is in a healthy relationship right now.  She did find Suki Perdomo very helpful but has now been referred to [facility] for dialectical behavioral therapy and needs a referral from me.    At this time, she denies any suicidal or homicidal ideations.  The patient tells me that besides feeling very tired, her moods have been alternating  between mild depression and anxiety but not major depression.  She was looking forward to going to [place] but a trip to the [facility] got in the way of this.  She denies any recent self-harm.  She is avoiding alcohol.  Her appetite is good.  She is sleeping 6-9 hours at night.  The patient has accepted that she likely has borderline personality disorder and severe abandonment fears, she is willing to return to psychiatry.      Past medical, surgical, family, and social history is reviewed and updated in Epic chart by me today.   Medications and allergies reviewed and updated in Epic chart by me today.     ROS:   As documented in history of present illness above    Exam:  General: Young  female, dressed in casual attire, good grooming and hygiene, in no apparent distress, calm and cooperative, good eye contact, no psychomotor agitation or retardation  Orientation: Alert and oriented to person, place and time  Recent and remote memory: Intact  Attention span and concentration: Intact  Speech: Spontaneous, normal rate, rhythm and tone  Thought Process:logical and very direct  Thought Content: Denies suicidal or homicidal ideations, intent or plan  Perception: Denies auditory or visual hallucinations.  She does admit that she frequently worries about being abandoned even though she does not feel that she has any concrete reasons for this  Language: Appropriate  Mood: Frequently up and down  Affect: Euthymic, mood congruent  Insight: Good  Judgment: Good  Depression Screen (PHQ-2/PHQ-9) 2/20/2018 3/22/2018 6/27/2018   PHQ-2 Total Score - - 4   PHQ-2 Total Score - - -   PHQ-2 Total Score 0 0 -   PHQ-9 Total Score - - 14   PHQ-9 Total Score - - -   PHQ-9 Total Score - - -       Interpretation of PHQ-9 Total Score   Score Severity   1-4 No Depression   5-9 Mild Depression   10-14 Moderate Depression   15-19 Moderately Severe Depression   20-27 Severe Depression      A/P:  1. Excessive sleepiness  -We will  certainly get some blood work.  We discussed that this is likely from her psychiatric state.  Encouraged her to exercise on a daily basis  - TSH; Future  - FREE THYROXINE; Future  - CBC WITH DIFFERENTIAL; Future  - COMP METABOLIC PANEL; Future  - IRON/TOTAL IRON BIND; Future    2. Moderate episode of recurrent major depressive disorder (HCC)  -She is willing to return to her psychiatrist and a referral was placed urgently.  She was given only 7 days of Klonopin to take in a situation in which she feels crowded by her family, friends or relationship.  Again she tells me that she has no suicidal or homicidal ideations.  She was referred as requested to dialectical behavioral therapy.  She is agreeable to returning to the ER if she has any return of suicidal ideations.  She will continue on Wellbutrin for now.  - REFERRAL TO PSYCHOLOGY  - clonazePAM (KLONOPIN) 0.5 MG Tab; Take 1 Tab by mouth at bedtime as needed for up to 7 days.  Dispense: 7 Tab; Refill: 1  - REFERRAL TO PSYCHIATRY

## 2018-07-09 DIAGNOSIS — M25.50 MULTIPLE JOINT PAIN: ICD-10-CM

## 2018-07-09 DIAGNOSIS — G47.10 EXCESSIVE SLEEPINESS: ICD-10-CM

## 2018-11-02 ENCOUNTER — OFFICE VISIT (OUTPATIENT)
Dept: BEHAVIORAL HEALTH | Facility: CLINIC | Age: 18
End: 2018-11-02
Payer: OTHER GOVERNMENT

## 2018-11-02 VITALS
DIASTOLIC BLOOD PRESSURE: 77 MMHG | HEIGHT: 65 IN | WEIGHT: 119 LBS | HEART RATE: 86 BPM | SYSTOLIC BLOOD PRESSURE: 103 MMHG | BODY MASS INDEX: 19.83 KG/M2

## 2018-11-02 DIAGNOSIS — F41.9 ANXIETY: ICD-10-CM

## 2018-11-02 DIAGNOSIS — F60.3 BORDERLINE PERSONALITY DISORDER (HCC): ICD-10-CM

## 2018-11-02 DIAGNOSIS — F33.0 MILD EPISODE OF RECURRENT MAJOR DEPRESSIVE DISORDER (HCC): ICD-10-CM

## 2018-11-02 DIAGNOSIS — F41.1 GAD (GENERALIZED ANXIETY DISORDER): ICD-10-CM

## 2018-11-02 DIAGNOSIS — F33.0 MDD (MAJOR DEPRESSIVE DISORDER), RECURRENT EPISODE, MILD (HCC): ICD-10-CM

## 2018-11-02 PROCEDURE — 99214 OFFICE O/P EST MOD 30 MIN: CPT | Performed by: PSYCHIATRY & NEUROLOGY

## 2018-11-02 RX ORDER — BUPROPION HYDROCHLORIDE 150 MG/1
150 TABLET ORAL EVERY MORNING
Qty: 90 TAB | Refills: 0 | Status: SHIPPED | OUTPATIENT
Start: 2018-11-02 | End: 2018-11-02

## 2018-11-02 RX ORDER — PROPRANOLOL HYDROCHLORIDE 10 MG/1
10 TABLET ORAL
COMMUNITY
End: 2021-06-24

## 2018-11-02 RX ORDER — LAMOTRIGINE 100 MG/1
100 TABLET ORAL DAILY
Qty: 30 TAB | Refills: 1 | Status: SHIPPED | OUTPATIENT
Start: 2018-11-02 | End: 2019-02-27 | Stop reason: SDUPTHER

## 2018-11-02 RX ORDER — LAMOTRIGINE 25 MG/1
TABLET ORAL
Qty: 45 TAB | Refills: 0 | Status: SHIPPED | OUTPATIENT
Start: 2018-11-02 | End: 2019-02-27

## 2018-11-02 RX ORDER — HYDROXYZINE HYDROCHLORIDE 25 MG/1
25 TABLET, FILM COATED ORAL 2 TIMES DAILY PRN
Qty: 60 TAB | Refills: 2 | Status: SHIPPED | OUTPATIENT
Start: 2018-11-02 | End: 2019-02-27 | Stop reason: SDUPTHER

## 2018-11-02 NOTE — PROGRESS NOTES
PSYCHIATRY FOLLOW-UP NOTE      Chief Complaint   Patient presents with   • Depression   • Anxiety   • Other     mood swings         History Of Present Illness:  Tania Melo is a 18 y.o. old female with major depressive disorder, anxiety disorder comes in today for follow up, was last seen for an initial evaluation over 8 months ago.  She reports being diagnosed with borderline personality disorder since her last visit here and she is currently seeing a psychotherapist for DBT.  She was having a tough time in her relationship and struggling with mood swings throughout the day.  She agrees with her diagnosis and is working hard in therapy.  She stopped taking Wellbutrin about 2 months ago as she felt that it was no longer helping her symptoms.  She was switched from Xanax to Klonopin but she has not been on it for the last few months.  She continues to struggle with mood swings throughout the day and is interested in a medication to help her.  She feels that her mood swings causes significant anxiety.  She was prescribed propranolol which she has been using for test taking anxiety and it seems to be helping.  She is currently single and feels that her personality affects her relationships as well.  Sleep has been up and down.  Appetite has been good.  She denies any self-harm behavior.  She denies any recent reckless or impulsive behaviors.  She does have some thoughts of death for minutes when she is in an emotional turmoil but denies having active thoughts, intent or plan of wanting to hurt herself.    Social History:   She lives in [place] with her parents and younger brother. She is a full time student at [employer] and is working on getting her bachelor's degree in biology.  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.    Substance Use:  Alcohol - Denies  Nicotine - Denies  Illicit drugs - Recreational cannabis use, 3 times a year    Past Medication Trials:  Zoloft x 2 weeks, Wellbutrin  " mg (ineffective), Xanax 0.25 mg, Klonopin 0.5 mg, Adderall XR 5 mg    Medications:  Current Outpatient Prescriptions   Medication Sig Dispense Refill   • propranolol (INDERAL) 10 MG Tab Take 10 mg by mouth 1 time daily as needed (anxiety).     • lamoTRIgine (LAMICTAL) 25 MG Tab Take 1 tablet daily x 2 weeks and then increase to 2 tablets x 2 weeks 45 Tab 0   • lamoTRIgine (LAMICTAL) 100 MG Tab Take 1 Tab by mouth every day. To be started on week 5 30 Tab 1   • hydrOXYzine HCl (ATARAX) 25 MG Tab Take 1 Tab by mouth 2 times a day as needed for Anxiety (and/or sleep). 60 Tab 2   • levonorgestrel-ethinyl estradiol (JOLESSA) 0.15-0.03 MG per tablet TAKE 1 TABLET DAILY (Patient not taking: Reported on 11/2/2018) 91 Tab 3     No current facility-administered medications for this visit.        Review Of Systems:    Constitutional - Positive for fatigue  Respiratory - Negative for shortness of breath, cough  CVS - Negative for chest pain, palpitations  GI - Negative for nausea, vomiting, abdominal pain, diarrhea, constipation  Musculoskeletal - Negative for back pain  Neurological - Negative for headaches  Psychiatric - Positive for mood swings, depression, anxiety, poor sleep    Physical Examination:  Vital signs: /77   Pulse 86   Ht 1.651 m (5' 5\")   Wt 54 kg (119 lb)   LMP 10/02/2018 (Approximate)   BMI 19.80 kg/m²     Musculoskeletal: Normal gait. No abnormal movements.     Mental Status Evaluation:   General: Young female, dressed in casual attire, good grooming and hygiene, in no apparent distress, calm and cooperative, good eye contact, no psychomotor agitation or retardation  Orientation: Alert and oriented to person, place and time  Recent and remote memory: Grossly intact  Attention span and concentration: Grossly intact  Speech: Spontaneous, normal rate, rhythm and tone  Thought Process: Linear, logical and goal directed  Thought Content: Denies suicidal or homicidal ideations, intent or " "plan  Perception: Denies auditory or visual hallucinations. No delusions noted  Associations: Intact  Language: Appropriate  Fund of knowledge and vocabulary: Grossly adequate  Mood: \"am okay\"  Affect: Anxious at times, mood congruent  Insight: Good  Judgment: Good    Depression screening:  Depression Screen (PHQ-2/PHQ-9) 2/20/2018 3/22/2018 6/27/2018   PHQ-2 Total Score - - 4   PHQ-2 Total Score - - -   PHQ-2 Total Score 0 0 -   PHQ-9 Total Score - - 14   PHQ-9 Total Score - - -   PHQ-9 Total Score - - -     Interpretation of PHQ-9 Total Score   Score Severity   1-4 No Depression   5-9 Mild Depression   10-14 Moderate Depression   15-19 Moderately Severe Depression   20-27 Severe Depression    Medical Records/Labs/Diagnostic Tests Reviewed:  [place]  records - appropriate refills, no abuse suspected      Impression:  1. Major depressive disorder, recurrent, mild - worsening  2. Generalized anxiety disorder - new diagnosis  3. Borderline personality disorder - new diagnosis     Plan:  1.  Start Lamictal 25 mg daily for 2 weeks followed by 50 mg daily for 2 weeks followed by 100 mg daily for mood stabilization secondary to borderline personality disorder.  She has tried 2 different antidepressants with partial response and it is worth trying a mood stabilizer for her symptoms secondary to her personality disorder.  Discussed side effects including headaches, liver enzyme elevation, Royce Jeronimo syndrome, rash etc. I have advised her to stop taking Lamisil if she notices any rash on her body.  2.  Start Hydroxyzine 25 mg twice daily as needed for anxiety and her sleep  3.  Will avoid Benzodiazepines at this time given she is working in psychotherapy to improve her coping skills.  4.  Continue Propranolol 10-20 mg as needed for anxiety  5. Continue individual psychotherapy with Dr. Andreina Martini, Ph.D    Return to clinic in 3 months or sooner if symptoms worsen    The proposed treatment plan was discussed with the " patient who was provided the opportunity to ask questions and make suggestions regarding alternative treatment. Patient verbalized understanding and expressed agreement with the plan.     Anna Marin M.D.  11/02/18    This note was created using voice recognition software (Dragon). The accuracy of the dictation is limited by the abilities of the software. I have reviewed the note prior to signing, however some errors in grammar and context are still possible. If you have any questions related to this note please do not hesitate to contact our office.

## 2018-11-02 NOTE — LETTER
2018      Reg: Tania Viviana Melo ( 2000)      To whom it may concern,      This is to inform you that Viviana Melo has been diagnosed with major depressive disorder, generalized anxiety disorder and borderline personality disorder. She is on medications and in psychotherapy for her psychiatric illness.      Please feel free to contact me if there are any further questions.      Sincerely,      Anna Marin MD

## 2018-11-02 NOTE — PATIENT INSTRUCTIONS
- Start Lamictal 25 mg x 2 weeks followed by 50 mg x 2 weeks followed by 100 mg at bedtime for mood stabilization  - Start Hydroxyzine 25 mg twice daily as needed for anxiety and/or sleep  - Continue Propranolol 10-20 mg daily as needed for anxiety

## 2019-02-04 ENCOUNTER — APPOINTMENT (OUTPATIENT)
Dept: BEHAVIORAL HEALTH | Facility: CLINIC | Age: 19
End: 2019-02-04
Payer: OTHER GOVERNMENT

## 2019-02-27 ENCOUNTER — OFFICE VISIT (OUTPATIENT)
Dept: BEHAVIORAL HEALTH | Facility: CLINIC | Age: 19
End: 2019-02-27
Payer: OTHER GOVERNMENT

## 2019-02-27 VITALS
HEIGHT: 65 IN | SYSTOLIC BLOOD PRESSURE: 126 MMHG | DIASTOLIC BLOOD PRESSURE: 84 MMHG | HEART RATE: 99 BPM | WEIGHT: 137 LBS | BODY MASS INDEX: 22.82 KG/M2

## 2019-02-27 DIAGNOSIS — R53.83 FATIGUE, UNSPECIFIED TYPE: ICD-10-CM

## 2019-02-27 DIAGNOSIS — F41.1 GAD (GENERALIZED ANXIETY DISORDER): ICD-10-CM

## 2019-02-27 DIAGNOSIS — F33.41 MDD (MAJOR DEPRESSIVE DISORDER), RECURRENT, IN PARTIAL REMISSION (HCC): ICD-10-CM

## 2019-02-27 DIAGNOSIS — F60.3 BORDERLINE PERSONALITY DISORDER (HCC): ICD-10-CM

## 2019-02-27 DIAGNOSIS — R63.5 WEIGHT GAIN: ICD-10-CM

## 2019-02-27 DIAGNOSIS — Z79.899 ENCOUNTER FOR LONG-TERM (CURRENT) USE OF MEDICATIONS: ICD-10-CM

## 2019-02-27 PROCEDURE — 99214 OFFICE O/P EST MOD 30 MIN: CPT | Performed by: PSYCHIATRY & NEUROLOGY

## 2019-02-27 RX ORDER — HYDROXYZINE HYDROCHLORIDE 25 MG/1
25 TABLET, FILM COATED ORAL 2 TIMES DAILY PRN
Qty: 180 TAB | Refills: 0 | Status: SHIPPED | OUTPATIENT
Start: 2019-02-27 | End: 2021-06-24

## 2019-02-27 RX ORDER — LAMOTRIGINE 100 MG/1
100 TABLET ORAL DAILY
Qty: 90 TAB | Refills: 0 | Status: SHIPPED | OUTPATIENT
Start: 2019-02-27 | End: 2019-05-17

## 2019-02-27 ASSESSMENT — PATIENT HEALTH QUESTIONNAIRE - PHQ9: CLINICAL INTERPRETATION OF PHQ2 SCORE: 0

## 2019-02-27 NOTE — PROGRESS NOTES
PSYCHIATRY FOLLOW-UP NOTE      Chief Complaint   Patient presents with   • Follow-Up     depression, anxiety         History Of Present Illness:  Tania Melo is a 18 y.o. old female with major depressive disorder, generalized anxiety disorder, borderline personality disorder comes in today for follow up, was last seen over 4 months ago.  She reports feeling better in regards to her mood and anxiety since her last visit with me.  She has been compliant with Lamictal and endorses improvement in her mood symptoms.  She is having less frequent mood swings and feels more in control of her life now.  She continues to see her therapist regularly and therapy seems to be benefiting her as well.  She denies any recent reckless or impulsive behaviors.  She continues to be in school and work full-time and denies any increase in her psychosocial stressors.  She has been in this relationship and it has been going good as well.  She denies any recent problems with her anxiety as well.  She continues to use propranolol for physical symptoms of anxiety especially before test taking and hydroxyzine on at other times and endorses benefit.  She denies any self-harm behavior.  She denies having thoughts of wanting to hurt herself or others.  She has noticed weight gain since she has been on Lamictal and is worried about it.  She does not think her appetite has increased in the last few months.  She also endorses some infrequent constipation, fatigue and thinning of her hair.    Social History:   She lives in [place] with her parents and younger brother.  She is currently in a relationship, has never been  and has no kids.  She is a full time student at [employer] and is working on getting her bachelor's degree in Biology.  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.    Substance Use:  Alcohol - Denies  Nicotine - Denies  Illicit drugs - Recreational cannabis use    Past Medication Trials:  Zoloft x 2  "weeks, Wellbutrin  mg (ineffective), Xanax 0.25 mg, Klonopin 0.5 mg, Adderall XR 5 mg    Medications:  Current Outpatient Prescriptions   Medication Sig Dispense Refill   • lamoTRIgine (LAMICTAL) 100 MG Tab Take 1 Tab by mouth every day. 90 Tab 0   • hydrOXYzine HCl (ATARAX) 25 MG Tab Take 1 Tab by mouth 2 times a day as needed for Anxiety (and/or sleep). 180 Tab 0   • levonorgestrel-ethinyl estradiol (JOLESSA) 0.15-0.03 MG per tablet TAKE 1 TABLET DAILY 91 Tab 3   • propranolol (INDERAL) 10 MG Tab Take 10 mg by mouth 1 time daily as needed (anxiety).       No current facility-administered medications for this visit.        Review Of Systems:    Constitutional - Positive for fatigue, weight gain  Respiratory - Negative for shortness of breath, cough  CVS - Negative for chest pain, palpitations  GI - Negative for nausea, vomiting, abdominal pain, diarrhea. Positive for infrequent constipation  Musculoskeletal - Negative for back pain  Neurological - Negative for headaches  Psychiatric - Positive for infrequent anxiety, depression    Physical Examination:  Vital signs: /84   Pulse 99   Ht 1.651 m (5' 5\")   Wt 62.1 kg (137 lb)   BMI 22.80 kg/m²     Musculoskeletal: Normal gait. No abnormal movements.     Mental Status Evaluation:   General: Young female, dressed in casual attire, good grooming and hygiene, in no apparent distress, calm and cooperative, good eye contact, no psychomotor agitation or retardation  Orientation: Alert and oriented to person, place and time  Recent and remote memory: Grossly intact  Attention span and concentration: Grossly intact  Speech: Spontaneous, normal rate, rhythm and tone  Thought Process: Linear, logical and goal directed  Thought Content: Denies suicidal or homicidal ideations, intent or plan  Perception: Denies auditory or visual hallucinations. No delusions noted  Associations: Intact  Language: Appropriate  Fund of knowledge and vocabulary: Grossly " "adequate  Mood: \"good\"  Affect: Euthymic, mood congruent  Insight: Good  Judgment: Good    Depression screening:  Depression Screen (PHQ-2/PHQ-9) 3/22/2018 6/27/2018 2/27/2019   PHQ-2 Total Score - 4 -   PHQ-2 Total Score - - -   PHQ-2 Total Score 0 - 0   PHQ-9 Total Score - 14 -   PHQ-9 Total Score - - -   PHQ-9 Total Score - - -       Interpretation of PHQ-9 Total Score   Score Severity   1-4 No Depression   5-9 Mild Depression   10-14 Moderate Depression   15-19 Moderately Severe Depression   20-27 Severe Depression    Medical Records/Labs/Diagnostic Tests Reviewed:  [place]  records - appropriate refills, no abuse suspected      Impression:  1. Major depressive disorder, recurrent, in partial remission - improving  2. Generalized anxiety disorder - improving  3. Borderline personality disorder - stable   4. Long term use of medication - Lamictal  5. Weight gain and constipation - new diagnosis    Plan:  1.  Continue Lamictal 100 mg daily for mood stabilization secondary to borderline personality disorder   - Will check LFT's for monitoring with Lamictal  2.  Continue Hydroxyzine 25 mg twice daily as needed for anxiety and/or sleep  3.  Continue Propranolol 10-20 mg daily as needed for physical symptoms of anxiety  4.  Continue individual psychotherapy with Dr. Andreina Martini, Ph.D  5.  TSH and free T4 ordered to rule out hypothyroidism    Return to clinic in 3 months or sooner if symptoms worsen    The proposed treatment plan was discussed with the patient who was provided the opportunity to ask questions and make suggestions regarding alternative treatment. Patient verbalized understanding and expressed agreement with the plan.     Anna Marin M.D.  02/27/19    This note was created using voice recognition software (Dragon). The accuracy of the dictation is limited by the abilities of the software. I have reviewed the note prior to signing, however some errors in grammar and context are still possible. If you " have any questions related to this note please do not hesitate to contact our office.

## 2019-03-18 RX ORDER — LEVONORGESTREL / ETHINYL ESTRADIOL 0.15-0.03
KIT ORAL
Qty: 91 TAB | Refills: 2 | Status: SHIPPED | OUTPATIENT
Start: 2019-03-18 | End: 2020-04-30

## 2019-03-18 NOTE — TELEPHONE ENCOUNTER
Was the patient seen in the last year in this department? Yes  LOV 6/27/18  Does patient have an active prescription for medications requested? No     Received Request Via: Pharmacy

## 2019-03-26 ENCOUNTER — OFFICE VISIT (OUTPATIENT)
Dept: MEDICAL GROUP | Facility: LAB | Age: 19
End: 2019-03-26
Payer: OTHER GOVERNMENT

## 2019-03-26 VITALS
HEIGHT: 65 IN | BODY MASS INDEX: 22.99 KG/M2 | HEART RATE: 84 BPM | RESPIRATION RATE: 12 BRPM | SYSTOLIC BLOOD PRESSURE: 104 MMHG | OXYGEN SATURATION: 96 % | DIASTOLIC BLOOD PRESSURE: 64 MMHG | WEIGHT: 138 LBS | TEMPERATURE: 97.9 F

## 2019-03-26 DIAGNOSIS — R63.5 WEIGHT GAIN: ICD-10-CM

## 2019-03-26 DIAGNOSIS — R42 DIZZINESS: ICD-10-CM

## 2019-03-26 DIAGNOSIS — I95.9 HYPOTENSION, UNSPECIFIED HYPOTENSION TYPE: ICD-10-CM

## 2019-03-26 PROCEDURE — 99214 OFFICE O/P EST MOD 30 MIN: CPT | Performed by: NURSE PRACTITIONER

## 2019-03-26 NOTE — PROGRESS NOTES
"Chief Complaint   Patient presents with   • Hypertension     low       HPI   Viviana is a 18-year-old established female here with concern regarding hypotension.  She tells me that she was seen at [facility] a few times and told to follow-up here regarding weight gain as well as low blood pressure, possibly asking for a cardiology referral.  Boyfriend is EMT and checking her BP - range systolic of 70 (unsure of diastolic) to 100/60.   + associated nausea, periodic confusion, but no syncopy.  Occasionally feels that she may pass out in heat / steamy shower.  Denies swelling in ankles.  No urinary or bowel problems.  Admits that she can fall asleep easily during the day but typically sleeps well at night.    Eating well and drinking plenty of fluids.    Boxing a few d per week for exercise and states occasionally she has to \"back off b/c heart rate gets high,\" but she does not have chest pain during these episodes.  Boxing x only 3-4 weeks.    + weight gain in 3 mo (19 lbs) despite feeling that she is eating healthier.  Normal thyroid lab work through [facility] 2 d ago.   Nonsmoker.  No hx of asthma.      Past medical, surgical, family, and social history is reviewed and updated in Epic chart by me today.   Medications and allergies reviewed and updated in Epic chart by me today.     ROS:   No headaches or vision changes.     Exam:  Blood pressure 104/64, pulse 84, temperature 36.6 °C (97.9 °F), resp. rate 12, height 1.651 m (5' 5\"), weight 62.6 kg (138 lb), SpO2 96 %.  Constitutional: Alert, no distress, plus 3 vital signs  Skin:  Warm, dry, no rashes invisible areas  Eye: Equal, round and reactive, conjunctiva clear  ENMT: Lips without lesions, good dentition, oropharynx clear    Neck: Trachea midline, no masses, no thyromegaly  Respiratory: Unlabored respiration, lungs clear to auscultation, no wheezes, no rhonchi  Cardiovascular: Normal rate and rhythm, no murmur, no edema  Abdomen: Soft, nontender, " no masses or hepatosplenomegaly.  No abdominal bruits.    Psych: Alert, pleasant, well-groomed, normal affect    Assessment / Plan / Medical Decision makin. Weight gain  - EC-ECHOCARDIOGRAM COMPLETE W/O CONT; Future    2. Hypotension, unspecified hypotension type  - EC-ECHOCARDIOGRAM COMPLETE W/O CONT; Future    3. Dizziness  - EC-ECHOCARDIOGRAM COMPLETE W/O CONT; Future      Recommend starting with an echocardiogram, staying well-hydrated and discussed that she does not need to restrict her sodium intake to a point.  Suspect that the weight gain is related to Lamictal as her 20 pound weight increase began in November and leveled off in February.  Discussed increasing her exercise program to 5 days/week and avoiding white carbohydrates.  She will follow-up with me as needed.

## 2019-04-03 ENCOUNTER — HOSPITAL ENCOUNTER (OUTPATIENT)
Dept: CARDIOLOGY | Facility: MEDICAL CENTER | Age: 19
End: 2019-04-03
Attending: NURSE PRACTITIONER
Payer: OTHER GOVERNMENT

## 2019-04-03 DIAGNOSIS — R63.5 WEIGHT GAIN: ICD-10-CM

## 2019-04-03 DIAGNOSIS — R42 DIZZINESS: ICD-10-CM

## 2019-04-03 DIAGNOSIS — I95.9 HYPOTENSION, UNSPECIFIED HYPOTENSION TYPE: ICD-10-CM

## 2019-04-03 LAB
LV EJECT FRACT  99904: 65
LV EJECT FRACT MOD 2C 99903: 69.06
LV EJECT FRACT MOD 4C 99902: 66.66
LV EJECT FRACT MOD BP 99901: 69.09

## 2019-04-03 PROCEDURE — 93306 TTE W/DOPPLER COMPLETE: CPT | Mod: 26 | Performed by: INTERNAL MEDICINE

## 2019-04-03 PROCEDURE — 93306 TTE W/DOPPLER COMPLETE: CPT

## 2019-05-17 ENCOUNTER — OFFICE VISIT (OUTPATIENT)
Dept: BEHAVIORAL HEALTH | Facility: CLINIC | Age: 19
End: 2019-05-17
Payer: OTHER GOVERNMENT

## 2019-05-17 VITALS
SYSTOLIC BLOOD PRESSURE: 127 MMHG | HEART RATE: 99 BPM | WEIGHT: 140 LBS | HEIGHT: 65 IN | BODY MASS INDEX: 23.32 KG/M2 | DIASTOLIC BLOOD PRESSURE: 68 MMHG

## 2019-05-17 DIAGNOSIS — F60.3 BORDERLINE PERSONALITY DISORDER (HCC): ICD-10-CM

## 2019-05-17 DIAGNOSIS — F33.42 MDD (MAJOR DEPRESSIVE DISORDER), RECURRENT, IN FULL REMISSION (HCC): ICD-10-CM

## 2019-05-17 DIAGNOSIS — F41.1 GAD (GENERALIZED ANXIETY DISORDER): ICD-10-CM

## 2019-05-17 PROCEDURE — 99214 OFFICE O/P EST MOD 30 MIN: CPT | Performed by: PSYCHIATRY & NEUROLOGY

## 2019-05-17 ASSESSMENT — PATIENT HEALTH QUESTIONNAIRE - PHQ9: CLINICAL INTERPRETATION OF PHQ2 SCORE: 0

## 2019-05-17 NOTE — PROGRESS NOTES
PSYCHIATRY FOLLOW-UP NOTE      Chief Complaint   Patient presents with   • Follow-Up     depression, anxiety         History Of Present Illness:  Tania Melo is a 18 y.o. old female with major depressive disorder, generalized anxiety disorder, borderline personality disorder comes in today for follow up, was last seen 3 months ago.  She has been doing really good in regards to her mood and anxiety since her last appointment with me.  She has been doing individual and group DBT psychotherapy and feels that she has learned a lot and she is using coping skills on her day-to-day life.  She recently finished her spring semester and did well and is taking the summer off from school.  She will however be working at her job.  She denies any current psychosocial stressors.  She has been doing really good in her relationship.  Sleep and appetite have been good.  She does not want to be on Lamictal anymore because she has noticed changes in her taste sensations which she does not like.  She has not taken Lamictal for the last 2 days and has not noticed any changes.  She denies any recent reckless or impulsive behaviors.  She denies any recent panic attacks.  She does not recall the last time she used hydroxyzine or propranolol to calm down her anxiety.  She denies any self-harm behavior.  She denies having thoughts of wanting to hurt herself or others.    Social History:   She lives in [place] with her parents and younger brother.  She is currently in a relationship, has never been  and has no kids.  She is a full time student at [employer] and is working on getting her bachelor's degree in Biology.  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    Substance Use:  Alcohol - Denies  Nicotine - Denies  Illicit drugs - Recreational cannabis use, once or twice a year    Past Medication Trials:  Zoloft x 2 weeks, Wellbutrin  mg (ineffective), Xanax 0.25 mg, Klonopin 0.5 mg, Adderall XR 5 mg,  "Lamotrigine )effective,     Medications:  Current Outpatient Prescriptions   Medication Sig Dispense Refill   • JOLESSA 0.15-0.03 MG per tablet TAKE 1 TABLET DAILY (Patient not taking: Reported on 5/17/2019) 91 Tab 2   • lamoTRIgine (LAMICTAL) 100 MG Tab Take 1 Tab by mouth every day. (Patient not taking: Reported on 5/17/2019) 90 Tab 0   • hydrOXYzine HCl (ATARAX) 25 MG Tab Take 1 Tab by mouth 2 times a day as needed for Anxiety (and/or sleep). 180 Tab 0   • propranolol (INDERAL) 10 MG Tab Take 10 mg by mouth 1 time daily as needed (anxiety).       No current facility-administered medications for this visit.        Review Of Systems:    Constitutional - Negative for fatigue  Respiratory - Negative for shortness of breath, cough  CVS - Negative for chest pain, palpitations  GI - Negative for nausea, vomiting, abdominal pain, diarrhea, constipation  Musculoskeletal - Negative for back pain  Neurological - Negative for headaches  Psychiatric - Negative for anxiety, depression, mood swings, sleep problems    Physical Examination:  Vital signs: /68   Pulse 99   Ht 1.651 m (5' 5\")   Wt 63.5 kg (140 lb)   BMI 23.30 kg/m²     Musculoskeletal: Normal gait. No abnormal movements.     Mental Status Evaluation:   General: Young female, dressed in casual attire, good grooming and hygiene, in no apparent distress, calm and cooperative, good eye contact, no psychomotor agitation or retardation  Orientation: Alert and oriented to person, place and time  Recent and remote memory: Grossly intact  Attention span and concentration: Grossly intact  Speech: Spontaneous, normal rate, rhythm and tone  Thought Process: Linear, logical and goal directed  Thought Content: Denies suicidal or homicidal ideations, intent or plan  Perception: Denies auditory or visual hallucinations. No delusions noted  Associations: Intact  Language: Appropriate  Fund of knowledge and vocabulary: Grossly adequate  Mood: \"doing really good\"  Affect: " Euthymic, mood congruent  Insight: Good  Judgment: Good    Depression screening:  Depression Screen (PHQ-2/PHQ-9) 6/27/2018 2/27/2019 5/17/2019   PHQ-2 Total Score 4 - -   PHQ-2 Total Score - - -   PHQ-2 Total Score - 0 0   PHQ-9 Total Score 14 - -   PHQ-9 Total Score - - -   PHQ-9 Total Score - - -       Interpretation of PHQ-9 Total Score   Score Severity   1-4 No Depression   5-9 Mild Depression   10-14 Moderate Depression   15-19 Moderately Severe Depression   20-27 Severe Depression    Medical Records/Labs/Diagnostic Tests Reviewed:  [place]  records - appropriate refills, no abuse suspected      Impression:  1. Major depressive disorder, recurrent, in full remission - improving  2. Generalized anxiety disorder - stable  3. Borderline personality disorder - stable     Plan:  1.  Discontinue Lamictal.  She is endorsing some side effects and would like to see if she can be off medications at this time.  She has been seeing her therapist for about a year and is currently engaging in both individual and group DBT psychotherapy which has been really helpful.  She is using her coping skills on a day-to-day basis and would like to see if she can do well without medications.  I have asked her to let her family and therapist therapist know that she is not on a mood stabilizer medication at this time.  I have encouraged her to watch her mood and if she or family notices any decline to contact me through my chart a phone call so that she can be put back on a different medication.  2.  Continue Hydroxyzine 25 mg twice daily as needed for anxiety and/or sleep  3.  Continue Propranolol 10-20 mg daily as needed for physical symptoms of anxiety  4.  Continue individual and group DBT psychotherapy with Dr. Andreina Martini, Ph.D    Return to clinic in 3-4 months or sooner if symptoms worsen    The proposed treatment plan was discussed with the patient who was provided the opportunity to ask questions and make suggestions regarding  alternative treatment. Patient verbalized understanding and expressed agreement with the plan.     Anna Marin M.D.  05/17/19    This note was created using voice recognition software (Dragon). The accuracy of the dictation is limited by the abilities of the software. I have reviewed the note prior to signing, however some errors in grammar and context are still possible. If you have any questions related to this note please do not hesitate to contact our office.

## 2021-06-24 ENCOUNTER — OFFICE VISIT (OUTPATIENT)
Dept: URGENT CARE | Facility: CLINIC | Age: 21
End: 2021-06-24
Payer: COMMERCIAL

## 2021-06-24 VITALS
TEMPERATURE: 98.2 F | HEART RATE: 102 BPM | BODY MASS INDEX: 20.14 KG/M2 | OXYGEN SATURATION: 98 % | RESPIRATION RATE: 12 BRPM | SYSTOLIC BLOOD PRESSURE: 102 MMHG | DIASTOLIC BLOOD PRESSURE: 64 MMHG | HEIGHT: 64 IN | WEIGHT: 118 LBS
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
## 2021-06-24 PROCEDURE — 99214 OFFICE O/P EST MOD 30 MIN: CPT | Mod: 25 | Performed by: NURSE PRACTITIONER

## 2021-06-24 RX ORDER — DOXYCYCLINE HYCLATE 100 MG
100 TABLET ORAL 2 TIMES DAILY
Qty: 14 TABLET | Refills: 0 | Status: SHIPPED | OUTPATIENT
Start: 2021-06-24 | End: 2021-07-01

## 2021-06-25 ASSESSMENT — ENCOUNTER SYMPTOMS
SHORTNESS OF BREATH: 0
EYE REDNESS: 0
CHILLS: 0
DIZZINESS: 0
MYALGIAS: 0
SORE THROAT: 0
FEVER: 0
VOMITING: 0
NAUSEA: 0

## 2022-04-02 ENCOUNTER — OFFICE VISIT (OUTPATIENT)
Dept: URGENT CARE | Facility: CLINIC | Age: 22
End: 2022-04-02
Payer: COMMERCIAL

## 2022-04-02 VITALS
HEIGHT: 64 IN | RESPIRATION RATE: 16 BRPM | BODY MASS INDEX: 21.24 KG/M2 | WEIGHT: 124.4 LBS | HEART RATE: 99 BPM | SYSTOLIC BLOOD PRESSURE: 110 MMHG | DIASTOLIC BLOOD PRESSURE: 80 MMHG | OXYGEN SATURATION: 98 % | TEMPERATURE: 98.8 F

## 2022-04-02 DIAGNOSIS — J02.9 VIRAL PHARYNGITIS: ICD-10-CM

## 2022-04-02 DIAGNOSIS — J02.9 SORE THROAT: ICD-10-CM

## 2022-04-02 LAB
INT CON NEG: NORMAL
INT CON POS: NORMAL
S PYO AG THROAT QL: NEGATIVE

## 2022-04-02 PROCEDURE — 87880 STREP A ASSAY W/OPTIC: CPT

## 2022-04-02 PROCEDURE — 99213 OFFICE O/P EST LOW 20 MIN: CPT

## 2022-04-02 ASSESSMENT — ENCOUNTER SYMPTOMS
BACK PAIN: 1
CARDIOVASCULAR NEGATIVE: 1
VOMITING: 0
DIARRHEA: 0
FEVER: 0
ABDOMINAL PAIN: 0
NAUSEA: 0
COUGH: 0
CHILLS: 0
EYES NEGATIVE: 1
SORE THROAT: 1

## 2022-04-02 NOTE — PROGRESS NOTES
"Subjective     Viviana Melo is a 21 y.o. female who presents with Pharyngitis (2x days ) and Coronavirus Screening (Today, negative )          HPI     Patient presents with sore throat for 2 days now.  She denies any fever, chills, fatigue, congestion, nausea, vomiting, diarrhea.  She does report some back pain, but does not think it is related to her current illness.  She did home covid test and was negative. She is COVID vaccinated.     PMH:  has a past medical history of Anxiety, Depression, and Psychiatric disorder.    She has no past medical history of ASTHMA or Diabetes.  MEDS: No current outpatient medications on file.  ALLERGIES:   Allergies   Allergen Reactions   • Egg Yolk    • Other Misc      cats     SURGHX: No past surgical history on file.  SOCHX:  reports that she has never smoked. She has never used smokeless tobacco. She reports current drug use. Drug: Inhaled. She reports that she does not drink alcohol.  FH: Reviewed with patient, not pertinent to this visit.       Review of Systems   Constitutional: Negative for chills, fever and malaise/fatigue.   HENT: Positive for sore throat. Negative for congestion.    Eyes: Negative.    Respiratory: Negative for cough.    Cardiovascular: Negative.    Gastrointestinal: Negative for abdominal pain, diarrhea, nausea and vomiting.   Genitourinary: Negative.    Musculoskeletal: Positive for back pain.   Skin: Negative.           Objective     /80 (BP Location: Right arm, Patient Position: Sitting, BP Cuff Size: Adult)   Pulse 99   Temp 37.1 °C (98.8 °F) (Temporal)   Resp 16   Ht 1.626 m (5' 4\")   Wt 56.4 kg (124 lb 6.4 oz)   SpO2 98%   BMI 21.35 kg/m²      Physical Exam  Constitutional:       Appearance: Normal appearance.   HENT:      Head: Normocephalic.      Nose: Nose normal.      Mouth/Throat:      Mouth: Mucous membranes are moist.      Pharynx: Posterior oropharyngeal erythema present. No pharyngeal swelling, oropharyngeal exudate or uvula " swelling.      Tonsils: No tonsillar exudate or tonsillar abscesses.   Eyes:      Extraocular Movements: Extraocular movements intact.   Cardiovascular:      Rate and Rhythm: Normal rate and regular rhythm.      Pulses: Normal pulses.      Heart sounds: Normal heart sounds.   Pulmonary:      Effort: Pulmonary effort is normal.      Breath sounds: Normal breath sounds.   Musculoskeletal:         General: Normal range of motion.      Cervical back: Normal range of motion.   Skin:     General: Skin is warm.   Neurological:      General: No focal deficit present.      Mental Status: She is alert.   Psychiatric:         Mood and Affect: Mood normal.         Behavior: Behavior normal.     Results:    Strep a-negative    Assessment & Plan       1. Viral pharyngitis      2. Sore throat    Patient's presentation is consistent with viral pharyngitis.  Discussed treatment plan with patient, she is agreeable and verbalized understanding.    Recommended supportive treatment at home:  OTC Tylenol or Motrin for fever/discomfort.  Warm saline gargles.  Increase oral fluid intake.    Differential diagnoses, supportive care, and indications for immediate follow-up discussed with patient. Pathogenesis of diagnosis discussed including typical length and natural progression.     Instructed to return to clinic or nearest emergency department for any change in condition, further concerns, or worsening of symptoms.    Electronically Signed by LINNETTE Infante

## 2022-07-05 ENCOUNTER — APPOINTMENT (OUTPATIENT)
Dept: MEDICAL GROUP | Facility: LAB | Age: 22
End: 2022-07-05
Payer: COMMERCIAL

## 2022-07-13 ENCOUNTER — APPOINTMENT (OUTPATIENT)
Dept: MEDICAL GROUP | Facility: LAB | Age: 22
End: 2022-07-13
Payer: COMMERCIAL

## 2022-08-01 ENCOUNTER — OFFICE VISIT (OUTPATIENT)
Dept: MEDICAL GROUP | Facility: LAB | Age: 22
End: 2022-08-01
Payer: COMMERCIAL

## 2022-08-01 VITALS
HEART RATE: 85 BPM | WEIGHT: 126 LBS | TEMPERATURE: 97.6 F | RESPIRATION RATE: 16 BRPM | OXYGEN SATURATION: 100 % | HEIGHT: 64 IN | SYSTOLIC BLOOD PRESSURE: 98 MMHG | DIASTOLIC BLOOD PRESSURE: 56 MMHG | BODY MASS INDEX: 21.51 KG/M2

## 2022-08-01 DIAGNOSIS — G47.9 SLEEP DISTURBANCE: ICD-10-CM

## 2022-08-01 DIAGNOSIS — Z92.89 HISTORY OF SLEEP STUDY: ICD-10-CM

## 2022-08-01 DIAGNOSIS — Z00.00 WELL ADULT EXAM: ICD-10-CM

## 2022-08-01 DIAGNOSIS — Z23 NEED FOR TDAP VACCINATION: ICD-10-CM

## 2022-08-01 PROCEDURE — 99395 PREV VISIT EST AGE 18-39: CPT | Mod: 25 | Performed by: NURSE PRACTITIONER

## 2022-08-01 PROCEDURE — 90715 TDAP VACCINE 7 YRS/> IM: CPT | Performed by: NURSE PRACTITIONER

## 2022-08-01 PROCEDURE — 90471 IMMUNIZATION ADMIN: CPT | Performed by: NURSE PRACTITIONER

## 2022-08-01 RX ORDER — LEVONORGESTREL AND ETHINYL ESTRADIOL 0.15-0.03
1 KIT ORAL
COMMUNITY
Start: 2022-07-21

## 2022-08-01 NOTE — PROGRESS NOTES
"CC  f/u    HPI:  21-year-old established female, last seen 3 years ago, here for annual exam without GYN care.  GYN care is up-to-date through [facility].  Just started OCP - about 1.5 weeks ago and is doing fine with this.   Pap normal at [facility] about a month ago.    Neg std testing at [facility].   [place], [place] finished her masters degree.    Mentions that her sleep is terrible.  Did \"mini sleep study,\" through [facility] and was told that she wakes up approx 16 x per hour.  Wakes up tired but can fall / stay asleep.  Had tried meditating, marijuana and trying to avoid rx meds.  Interested in a formal sleep study and possibly seeing a sleep specialist and/or ENT as she has been told that she has a few jaw issues that could cause some of her sleep problems.    Otherwise doing really well.    Exam:  BP (!) 98/56   Pulse 85   Temp 36.4 °C (97.6 °F) (Temporal)   Resp 16   Ht 1.626 m (5' 4\")   Wt 57.2 kg (126 lb)   SpO2 100%   Well developed, well nourished female in no apparent distress.  Eyes: Conjunctivae and sclerae are clear and non-icteric. Pupils are equally round and reactive to light, extra-ocular movements intact.   ENT: Bilateral tympanic membranes obstructed by cerumen which was easily removed by medical assistant there irrigation independent membranes are translucent afterwards.  Oropharynx shows very mildly enlarged tonsils, not even +1 and there is no erythema or exudate.  CV: Heart is regular without murmur, rub or gallop.  Pulm: Clear to auscultation.  GI: Abdomen is soft, non-tender,  with normoactive bowel sounds in all four quadrants. No hepatosplenomegaly is appreciated. No masses are palpated. No guarding or rebound is noted.  Psychiatric: The patient is alert and oriented in all four spheres. Mood is euthymic. Affect is appropriate for the situation.  Skin: No rashes were noted.  Musculoskeletal: Gait is normal. Patient is able to transfer from sitting " position to exam table without assistance.    A/P:    1. Well adult exam     2. Need for Tdap vaccination  Tdap =>8yo IM   3. History of sleep study  Referral to ENT    Referral to Pulmonary and Sleep Medicine   4. Sleep disturbance  Referral to ENT    Referral to Pulmonary and Sleep Medicine     Updated Tdap.  Wax removed from both ears and patient did well with this.  Encouraged her to obtain a copy of the mini sleep study that she did with her dentist and to follow-up with both ENT and sleep medicine.  Encouraged her to have a formal sleep study prior to seeing ENT.  She preferred to refrain from any prescription sleep medications.  Declined blood work.  Pap and STD screening up-to-date.  Doing well on current OCP, not prescribed by me today.  Pt counseled on birth control pills. Risks, benefits and complications from hormone use reviewed. Failure rates discussed. Back up contraception and STD prevention discussed.     Follow-up yearly